# Patient Record
Sex: MALE | Race: WHITE | NOT HISPANIC OR LATINO | Employment: PART TIME | URBAN - METROPOLITAN AREA
[De-identification: names, ages, dates, MRNs, and addresses within clinical notes are randomized per-mention and may not be internally consistent; named-entity substitution may affect disease eponyms.]

---

## 2023-11-11 ASSESSMENT — ENCOUNTER SYMPTOMS
APPETITE CHANGE: 0
ABDOMINAL DISTENTION: 0
JOINT SWELLING: 0
POLYPHAGIA: 0
FACIAL SWELLING: 0
COLOR CHANGE: 0
DIARRHEA: 0
SORE THROAT: 0
TREMORS: 0
DIFFICULTY URINATING: 0
FEVER: 0
CHOKING: 0
ABDOMINAL PAIN: 0
DIZZINESS: 0
ANAL BLEEDING: 0
HEMATURIA: 0
NAUSEA: 0
CONSTIPATION: 0
PALPITATIONS: 0
SHORTNESS OF BREATH: 0
FREQUENCY: 0
HEADACHES: 0
WHEEZING: 0
EYE DISCHARGE: 0
NERVOUS/ANXIOUS: 0
MYALGIAS: 0
EYE PAIN: 0
POLYDIPSIA: 0
ARTHRALGIAS: 0
WEAKNESS: 0
CONFUSION: 0
FATIGUE: 0
COUGH: 0
SLEEP DISTURBANCE: 0
CHEST TIGHTNESS: 0
VOMITING: 0
NUMBNESS: 0
CHILLS: 0

## 2023-11-12 NOTE — PROGRESS NOTES
Subjective   Patient ID: Pavel Ha is a 37 y.o. male  with no significant medical history who presents for No chief complaint on file..    [facility]    Review of Systems   Constitutional:  Negative for appetite change, chills, fatigue and fever.   HENT:  Negative for congestion, ear pain, facial swelling, hearing loss, nosebleeds and sore throat.    Eyes:  Negative for pain, discharge and visual disturbance.   Respiratory:  Negative for cough, choking, chest tightness, shortness of breath and wheezing.    Cardiovascular:  Negative for chest pain, palpitations and leg swelling.   Gastrointestinal:  Negative for abdominal distention, abdominal pain, anal bleeding, constipation, diarrhea, nausea and vomiting.   Endocrine: Negative for cold intolerance, heat intolerance, polydipsia, polyphagia and polyuria.   Genitourinary:  Negative for difficulty urinating, frequency, hematuria and urgency.   Musculoskeletal:  Negative for arthralgias, gait problem, joint swelling and myalgias.   Skin:  Negative for color change and rash.   Neurological:  Negative for dizziness, tremors, syncope, weakness, numbness and headaches.   Psychiatric/Behavioral:  Negative for behavioral problems, confusion, sleep disturbance and suicidal ideas. The patient is not nervous/anxious.        Objective   There were no vitals taken for this visit.    Physical Exam  Constitutional:       General: He is not in acute distress.     Appearance: Normal appearance.   HENT:      Head: Normocephalic and atraumatic.      Nose: Nose normal.   Eyes:      Extraocular Movements: Extraocular movements intact.      Conjunctiva/sclera: Conjunctivae normal.      Pupils: Pupils are equal, round, and reactive to light.   Cardiovascular:      Rate and Rhythm: Normal rate and regular rhythm.      Pulses: Normal pulses.      Heart sounds: Normal heart sounds.   Pulmonary:      Effort: Pulmonary effort is normal.       Breath sounds: Normal breath sounds.   Abdominal:      General: Bowel sounds are normal.      Palpations: Abdomen is soft.   Musculoskeletal:         General: Normal range of motion.      Cervical back: Normal range of motion and neck supple.   Neurological:      General: No focal deficit present.      Mental Status: He is alert and oriented to person, place, and time.   Psychiatric:         Mood and Affect: Mood normal.         Behavior: Behavior normal.         Thought Content: Thought content normal.         Judgment: Judgment normal.         Assessment/Plan   {Assess/PlanSmartLinks:18690}

## 2023-11-14 RX ORDER — DOXYCYCLINE 100 MG/1
CAPSULE ORAL 2 TIMES DAILY
COMMUNITY
Start: 2019-09-11 | End: 2023-11-15 | Stop reason: ALTCHOICE

## 2023-11-14 ASSESSMENT — ENCOUNTER SYMPTOMS
DIZZINESS: 0
FREQUENCY: 0
FEVER: 0
EYE PAIN: 0
JOINT SWELLING: 0
POLYPHAGIA: 0
PALPITATIONS: 0
HEMATURIA: 0
EYE DISCHARGE: 0
ABDOMINAL DISTENTION: 0
NERVOUS/ANXIOUS: 0
ABDOMINAL PAIN: 0
SHORTNESS OF BREATH: 0
MYALGIAS: 0
FATIGUE: 0
DIFFICULTY URINATING: 0
NAUSEA: 0
APPETITE CHANGE: 0
CHILLS: 0
POLYDIPSIA: 0
VOMITING: 0
SLEEP DISTURBANCE: 0
CONSTIPATION: 0
ARTHRALGIAS: 0
WHEEZING: 0
CHEST TIGHTNESS: 0
TREMORS: 0
ANAL BLEEDING: 0
CONFUSION: 0
COUGH: 0
SORE THROAT: 0
NUMBNESS: 0
HEADACHES: 0
COLOR CHANGE: 0
CHOKING: 0
DIARRHEA: 0
FACIAL SWELLING: 0
WEAKNESS: 0

## 2023-11-14 NOTE — PROGRESS NOTES
Subjective   Patient ID: Pavel Ha is a 37 y.o. male with no significant medical history who presents for Establish Care, Back Pain, and Left shoulder pain.    HPI the patient is presented to become reestablished care with a primary care provider.  He is complaining of left shoulder pain which he developed 2 months. The pain comes and goes and gets worse with some movements.   He is also complaining of numbness of his left hand which comes and goes.  He works a lot with his hands at work.  Denies pain.  Additionally, he is complaining of low back pain which comes 2-3 times a years. It is localized on the left side and radiates to his left hip down the foot. It gets worse with bending certain way or lifting. He denies tingling or numbness of the lower extremities.  He denies any injury or trauma in the past.  Does not lift anything heavy.    Review of Systems   Constitutional:  Negative for appetite change, chills, fatigue and fever.   HENT:  Negative for congestion, ear pain, facial swelling, hearing loss, nosebleeds and sore throat.    Eyes:  Negative for pain, discharge and visual disturbance.   Respiratory:  Negative for cough, choking, chest tightness, shortness of breath and wheezing.    Cardiovascular:  Negative for chest pain, palpitations and leg swelling.   Gastrointestinal:  Negative for abdominal distention, abdominal pain, anal bleeding, constipation, diarrhea, nausea and vomiting.   Endocrine: Negative for cold intolerance, heat intolerance, polydipsia, polyphagia and polyuria.   Genitourinary:  Negative for difficulty urinating, frequency, hematuria and urgency.   Musculoskeletal:  Negative for arthralgias, gait problem, joint swelling and myalgias.        Left shoulder pain, lower back pain   Skin:  Negative for color change and rash.   Neurological:  Negative for dizziness, tremors, syncope, weakness, numbness and headaches.        Tingling and numbness of left hand   Psychiatric/Behavioral:  " Negative for behavioral problems, confusion, sleep disturbance and suicidal ideas. The patient is not nervous/anxious.        Objective   /72   Temp 36.2 °C (97.1 °F)   Ht 1.702 m (5' 7\")   Wt 81.6 kg (180 lb)   BMI 28.19 kg/m²     Physical Exam  Constitutional:       General: He is not in acute distress.     Appearance: Normal appearance.   HENT:      Head: Normocephalic and atraumatic.      Nose: Nose normal.   Eyes:      Extraocular Movements: Extraocular movements intact.      Conjunctiva/sclera: Conjunctivae normal.      Pupils: Pupils are equal, round, and reactive to light.   Cardiovascular:      Rate and Rhythm: Normal rate and regular rhythm.      Pulses: Normal pulses.      Heart sounds: Normal heart sounds.   Pulmonary:      Effort: Pulmonary effort is normal.      Breath sounds: Normal breath sounds.   Abdominal:      General: Bowel sounds are normal.      Palpations: Abdomen is soft.   Musculoskeletal:         General: Normal range of motion.      Cervical back: Normal range of motion and neck supple.      Comments: Mild point tenderness on palpation over left piriformis   Neurological:      General: No focal deficit present.      Mental Status: He is alert and oriented to person, place, and time.   Psychiatric:         Mood and Affect: Mood normal.         Behavior: Behavior normal.         Thought Content: Thought content normal.         Judgment: Judgment normal.         Assessment/Plan     Low back pain  Occasional  With radiation to the left lower leg  Start physical therapy, referral is given  Apply heating pads  Take Tylenol Extra Strength 2-3 times a day as needed for pain  Avoid NSAIDs  X-ray lumbar requisitions given to the patient    Left shoulder pain  Comes and goes  Start physical therapy  Apply heating pads  Apply topical creams such as Voltaren or Biofreeze    Tingling and numbness of left hand  Most likely due to carpal tunnel syndrome  Wear a wrist brace at night  Avoid " repetitive movements  If it gets worse, we will refer to hand specialist    Follow-up as needed

## 2023-11-15 ENCOUNTER — ANCILLARY PROCEDURE (OUTPATIENT)
Dept: RADIOLOGY | Facility: CLINIC | Age: 38
End: 2023-11-15
Payer: MEDICAID

## 2023-11-15 ENCOUNTER — OFFICE VISIT (OUTPATIENT)
Dept: PRIMARY CARE | Facility: CLINIC | Age: 38
End: 2023-11-15
Payer: MEDICAID

## 2023-11-15 VITALS
HEIGHT: 67 IN | BODY MASS INDEX: 28.25 KG/M2 | TEMPERATURE: 97.1 F | DIASTOLIC BLOOD PRESSURE: 72 MMHG | SYSTOLIC BLOOD PRESSURE: 112 MMHG | WEIGHT: 180 LBS

## 2023-11-15 DIAGNOSIS — R53.83 OTHER FATIGUE: ICD-10-CM

## 2023-11-15 DIAGNOSIS — M54.42 CHRONIC LEFT-SIDED LOW BACK PAIN WITH LEFT-SIDED SCIATICA: ICD-10-CM

## 2023-11-15 DIAGNOSIS — E78.5 DYSLIPIDEMIA: ICD-10-CM

## 2023-11-15 DIAGNOSIS — E55.9 MILD VITAMIN D DEFICIENCY: ICD-10-CM

## 2023-11-15 DIAGNOSIS — G56.02 CARPAL TUNNEL SYNDROME OF LEFT WRIST: ICD-10-CM

## 2023-11-15 DIAGNOSIS — G89.29 CHRONIC LEFT SHOULDER PAIN: ICD-10-CM

## 2023-11-15 DIAGNOSIS — M25.512 CHRONIC LEFT SHOULDER PAIN: ICD-10-CM

## 2023-11-15 DIAGNOSIS — G89.29 CHRONIC LEFT-SIDED LOW BACK PAIN WITH LEFT-SIDED SCIATICA: Primary | ICD-10-CM

## 2023-11-15 DIAGNOSIS — G89.29 CHRONIC LEFT-SIDED LOW BACK PAIN WITH LEFT-SIDED SCIATICA: ICD-10-CM

## 2023-11-15 DIAGNOSIS — M54.42 CHRONIC LEFT-SIDED LOW BACK PAIN WITH LEFT-SIDED SCIATICA: Primary | ICD-10-CM

## 2023-11-15 PROCEDURE — 72110 X-RAY EXAM L-2 SPINE 4/>VWS: CPT | Performed by: RADIOLOGY

## 2023-11-15 PROCEDURE — 72110 X-RAY EXAM L-2 SPINE 4/>VWS: CPT | Mod: FY

## 2023-11-15 PROCEDURE — 1036F TOBACCO NON-USER: CPT | Performed by: PHYSICIAN ASSISTANT

## 2023-11-15 PROCEDURE — 99204 OFFICE O/P NEW MOD 45 MIN: CPT | Performed by: PHYSICIAN ASSISTANT

## 2023-11-15 RX ORDER — AMOXICILLIN AND CLAVULANATE POTASSIUM 875; 125 MG/1; MG/1
TABLET, FILM COATED ORAL
COMMUNITY
Start: 2023-06-21 | End: 2023-11-15 | Stop reason: ALTCHOICE

## 2023-11-15 ASSESSMENT — PATIENT HEALTH QUESTIONNAIRE - PHQ9
SUM OF ALL RESPONSES TO PHQ9 QUESTIONS 1 AND 2: 0
1. LITTLE INTEREST OR PLEASURE IN DOING THINGS: NOT AT ALL
2. FEELING DOWN, DEPRESSED OR HOPELESS: NOT AT ALL

## 2023-11-15 ASSESSMENT — COLUMBIA-SUICIDE SEVERITY RATING SCALE - C-SSRS
1. IN THE PAST MONTH, HAVE YOU WISHED YOU WERE DEAD OR WISHED YOU COULD GO TO SLEEP AND NOT WAKE UP?: NO
2. HAVE YOU ACTUALLY HAD ANY THOUGHTS OF KILLING YOURSELF?: NO
6. HAVE YOU EVER DONE ANYTHING, STARTED TO DO ANYTHING, OR PREPARED TO DO ANYTHING TO END YOUR LIFE?: NO

## 2023-11-15 ASSESSMENT — ENCOUNTER SYMPTOMS
OCCASIONAL FEELINGS OF UNSTEADINESS: 0
DEPRESSION: 0
LOSS OF SENSATION IN FEET: 0

## 2023-11-16 ENCOUNTER — LAB (OUTPATIENT)
Dept: LAB | Facility: LAB | Age: 38
End: 2023-11-16
Payer: MEDICAID

## 2023-11-16 DIAGNOSIS — E78.5 DYSLIPIDEMIA: ICD-10-CM

## 2023-11-16 DIAGNOSIS — E55.9 MILD VITAMIN D DEFICIENCY: ICD-10-CM

## 2023-11-16 DIAGNOSIS — R53.83 OTHER FATIGUE: ICD-10-CM

## 2023-11-16 LAB
ALBUMIN SERPL BCP-MCNC: 4.9 G/DL (ref 3.4–5)
ALP SERPL-CCNC: 66 U/L (ref 33–120)
ALT SERPL W P-5'-P-CCNC: 17 U/L (ref 10–52)
ANION GAP SERPL CALC-SCNC: 16 MMOL/L (ref 10–20)
AST SERPL W P-5'-P-CCNC: 20 U/L (ref 9–39)
BILIRUB SERPL-MCNC: 0.9 MG/DL (ref 0–1.2)
BUN SERPL-MCNC: 20 MG/DL (ref 6–23)
CALCIUM SERPL-MCNC: 9.5 MG/DL (ref 8.6–10.6)
CHLORIDE SERPL-SCNC: 103 MMOL/L (ref 98–107)
CHOLEST SERPL-MCNC: 183 MG/DL (ref 0–199)
CHOLESTEROL/HDL RATIO: 2.7
CO2 SERPL-SCNC: 24 MMOL/L (ref 21–32)
CREAT SERPL-MCNC: 1.03 MG/DL (ref 0.5–1.3)
ERYTHROCYTE [DISTWIDTH] IN BLOOD BY AUTOMATED COUNT: 12.7 % (ref 11.5–14.5)
GFR SERPL CREATININE-BSD FRML MDRD: >90 ML/MIN/1.73M*2
GLUCOSE SERPL-MCNC: 88 MG/DL (ref 74–99)
HCT VFR BLD AUTO: 45.1 % (ref 41–52)
HDLC SERPL-MCNC: 67.2 MG/DL
HGB BLD-MCNC: 15.1 G/DL (ref 13.5–17.5)
LDLC SERPL CALC-MCNC: 107 MG/DL
MCH RBC QN AUTO: 31 PG (ref 26–34)
MCHC RBC AUTO-ENTMCNC: 33.5 G/DL (ref 32–36)
MCV RBC AUTO: 93 FL (ref 80–100)
NON HDL CHOLESTEROL: 116 MG/DL (ref 0–149)
NRBC BLD-RTO: 0 /100 WBCS (ref 0–0)
PLATELET # BLD AUTO: 225 X10*3/UL (ref 150–450)
POTASSIUM SERPL-SCNC: 4.1 MMOL/L (ref 3.5–5.3)
PROT SERPL-MCNC: 7.7 G/DL (ref 6.4–8.2)
RBC # BLD AUTO: 4.87 X10*6/UL (ref 4.5–5.9)
SODIUM SERPL-SCNC: 139 MMOL/L (ref 136–145)
TRIGL SERPL-MCNC: 42 MG/DL (ref 0–149)
TSH SERPL-ACNC: 1.41 MIU/L (ref 0.44–3.98)
VLDL: 8 MG/DL (ref 0–40)
WBC # BLD AUTO: 7.1 X10*3/UL (ref 4.4–11.3)

## 2023-11-16 PROCEDURE — 84443 ASSAY THYROID STIM HORMONE: CPT

## 2023-11-16 PROCEDURE — 82652 VIT D 1 25-DIHYDROXY: CPT

## 2023-11-16 PROCEDURE — 36415 COLL VENOUS BLD VENIPUNCTURE: CPT

## 2023-11-16 PROCEDURE — 80053 COMPREHEN METABOLIC PANEL: CPT

## 2023-11-16 PROCEDURE — 85027 COMPLETE CBC AUTOMATED: CPT

## 2023-11-16 PROCEDURE — 80061 LIPID PANEL: CPT

## 2023-11-18 LAB — 1,25(OH)2D3 SERPL-MCNC: 61.7 PG/ML (ref 19.9–79.3)

## 2023-11-20 ENCOUNTER — APPOINTMENT (OUTPATIENT)
Dept: PRIMARY CARE | Facility: CLINIC | Age: 38
End: 2023-11-20
Payer: MEDICAID

## 2023-11-30 ASSESSMENT — ENCOUNTER SYMPTOMS
COUGH: 0
JOINT SWELLING: 0
FATIGUE: 0
WHEEZING: 0
CHILLS: 0
CHOKING: 0
POLYPHAGIA: 0
FREQUENCY: 0
NUMBNESS: 0
SORE THROAT: 0
MYALGIAS: 0
DIZZINESS: 0
NAUSEA: 0
DIFFICULTY URINATING: 0
POLYDIPSIA: 0
APPETITE CHANGE: 0
DIARRHEA: 0
CHEST TIGHTNESS: 0
EYE PAIN: 0
WEAKNESS: 0
FEVER: 0
CONSTIPATION: 0
PALPITATIONS: 0
ARTHRALGIAS: 0
EYE DISCHARGE: 0
ANAL BLEEDING: 0
FACIAL SWELLING: 0
TREMORS: 0
ABDOMINAL DISTENTION: 0
COLOR CHANGE: 0
HEMATURIA: 0
HEADACHES: 0
ABDOMINAL PAIN: 0
CONFUSION: 0
SHORTNESS OF BREATH: 0
SLEEP DISTURBANCE: 0
NERVOUS/ANXIOUS: 0
VOMITING: 0

## 2023-12-01 NOTE — PROGRESS NOTES
"Subjective   Patient ID: Pavel Ha is a 38 y.o. male with no significant medical history who presents for Earache (Left/Wax and wanes for approx. 2 months).    HPI the patient is presented with complaints of left ear pain which comes once or twice per week for the past 2 months. The pain lasts for 5 minutes. There is no sharp or shooting pain. He denies drainage, fever, chills, headaches or dizziness.  Per patient, he grinded his teeth in the past but is not sure if he does now.  The patient started physical therapy for lower back pain and shoulder pain.  Stated it had gotten a little better.    Review of Systems   Constitutional:  Negative for appetite change, chills, fatigue and fever.   HENT:  Positive for ear pain. Negative for congestion, facial swelling, hearing loss, nosebleeds and sore throat.    Eyes:  Negative for pain, discharge and visual disturbance.   Respiratory:  Negative for cough, choking, chest tightness, shortness of breath and wheezing.    Cardiovascular:  Negative for chest pain, palpitations and leg swelling.   Gastrointestinal:  Negative for abdominal distention, abdominal pain, anal bleeding, constipation, diarrhea, nausea and vomiting.   Endocrine: Negative for cold intolerance, heat intolerance, polydipsia, polyphagia and polyuria.   Genitourinary:  Negative for difficulty urinating, frequency, hematuria and urgency.   Musculoskeletal:  Negative for arthralgias, gait problem, joint swelling and myalgias.        Left shoulder pain, lower back pain   Skin:  Negative for color change and rash.   Neurological:  Negative for dizziness, tremors, syncope, weakness, numbness and headaches.        Tingling and numbness of left hand   Psychiatric/Behavioral:  Negative for behavioral problems, confusion, sleep disturbance and suicidal ideas. The patient is not nervous/anxious.        Objective   /70   Temp 36.1 °C (96.9 °F)   Ht 1.702 m (5' 7\")   Wt 81.6 kg (180 lb)   BMI 28.19 " kg/m²     Physical Exam  Constitutional:       General: He is not in acute distress.     Appearance: Normal appearance.   HENT:      Head: Normocephalic and atraumatic.      Ears:      Comments: Unremarkable left ear exam     Nose: Nose normal.      Mouth/Throat:      Comments: Grinded front lower teeth  Eyes:      Extraocular Movements: Extraocular movements intact.      Conjunctiva/sclera: Conjunctivae normal.      Pupils: Pupils are equal, round, and reactive to light.   Cardiovascular:      Rate and Rhythm: Normal rate and regular rhythm.      Pulses: Normal pulses.      Heart sounds: Normal heart sounds.   Pulmonary:      Effort: Pulmonary effort is normal.      Breath sounds: Normal breath sounds.   Abdominal:      General: Bowel sounds are normal.      Palpations: Abdomen is soft.   Musculoskeletal:         General: Normal range of motion.      Cervical back: Normal range of motion and neck supple.      Comments: Mild point tenderness on palpation over left piriformis   Neurological:      General: No focal deficit present.      Mental Status: He is alert and oriented to person, place, and time.   Psychiatric:         Mood and Affect: Mood normal.         Behavior: Behavior normal.         Thought Content: Thought content normal.         Judgment: Judgment normal.         Assessment/Plan   Left earache  Unremarkable exam  Most likely due to TMJ  The patient was advised to get a mouthguard  If no improvement, will consider ENT referral    Low back pain  Occasional  With radiation to the left lower leg  Continue physical therapy  Apply heating pads  Take Tylenol Extra Strength 2-3 times a day as needed for pain  Avoid NSAIDs  X-ray lumbar demonstrated spondylosis and facet disease    Left shoulder pain  Comes and goes  Start physical therapy  Apply heating pads  Apply topical creams such as Voltaren or Biofreeze    Tingling and numbness of left hand  Most likely due to carpal tunnel syndrome  Wear a wrist brace  at night  Avoid repetitive movements  If it gets worse, we will refer to hand specialist    Follow-up as needed

## 2023-12-04 ENCOUNTER — EVALUATION (OUTPATIENT)
Dept: PHYSICAL THERAPY | Facility: CLINIC | Age: 38
End: 2023-12-04
Payer: MEDICAID

## 2023-12-04 DIAGNOSIS — R29.898 WEAKNESS OF BOTH HIPS: ICD-10-CM

## 2023-12-04 DIAGNOSIS — M25.512 CHRONIC LEFT SHOULDER PAIN: ICD-10-CM

## 2023-12-04 DIAGNOSIS — G89.29 CHRONIC LEFT-SIDED LOW BACK PAIN WITH LEFT-SIDED SCIATICA: Primary | ICD-10-CM

## 2023-12-04 DIAGNOSIS — M54.42 CHRONIC LEFT-SIDED LOW BACK PAIN WITH LEFT-SIDED SCIATICA: Primary | ICD-10-CM

## 2023-12-04 DIAGNOSIS — G89.29 CHRONIC LEFT SHOULDER PAIN: ICD-10-CM

## 2023-12-04 PROCEDURE — 97162 PT EVAL MOD COMPLEX 30 MIN: CPT | Mod: GP

## 2023-12-04 PROCEDURE — 97110 THERAPEUTIC EXERCISES: CPT | Mod: GP

## 2023-12-04 NOTE — PROGRESS NOTES
Physical Therapy  Physical Therapy Orthopedic Evaluation    Patient Name: Pavel Ha  MRN: [id number]  Today's Date: 12/5/2023       Insurance:  Visit number: 1 of 30  Authorization info: No auth   Insurance Type: Payor: [employer] MEDICA[employer] / Plan: [employer] / Product Type: *No Product type* /      Current Problem  1. Chronic left-sided low back pain with left-sided sciatica  Referral to Physical Therapy    Follow Up In Physical Therapy      2. Chronic left shoulder pain  Referral to Physical Therapy    Follow Up In Physical Therapy      3. Weakness of both hips  Follow Up In Physical Therapy          Referred by: Dr. Angela Pulido     General:  General  Reason for Referral: L shoulder and low back pain  Referred By: Dr. Pulido    Precautions:  Precautions  Precautions Comment: None  Medical History Form: Reviewed (scanned into chart)    Subjective:   MARY: Pt reports to evaluation due to L sided low back pain with radicular symptoms and L shoulder pain. 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 His shoulder has been bothering him for about 2 months. Pt has to use his upper body a lot for work and for other physical activities. Pt will sometimes get pain down his leg and into his foot. Pt will also get some numbness into his L hand from time to time.     Previous Med Management: Stretching and imaging of low back.     PMH: Nothing related to back or shoulder     Aggravating Factors: Shoulder: Reaching away from his body, driving, putting things on shelves. Back: Bending over, lifting things with bad form.     Relieving Factors:  Shoulder: Keeping it next to him. Back: Lay down, sitting down.     Pain/Symptom Scale:  Current: Shoulder: 2/10 Back: 0/10  Worst: Shoulder: 8/10 Back: 8/10  Best: 0/10 for both   Location/Nature:  Meds: Ibuprofen     PLOF: Pt is able to perform all activities and tasks but has to monitor his pain.    ADL: Pt has some discomfort when performing UE dressing but is able to complete it.   Work: [employer]   Athletics: Snowboarding, running, swimming   Recreation/ Hobbies: Being with his daughter     Goals: Pt would like to improve his lumbar pain and get his shoulder back to his PLOF.     Language: English      Red Flags: Do you have any of the following? No  Fever/chills, unexplained weight changes, dizziness/fainting, unexplained change in bowel or bladder functions, unexplained malaise or muscle weakness, night pain/sweats, numbness or tingling    Objective:  Palpation / Observation   Pt had increased lumbar paraspinal and lumbar spine stiffness upon palpation. Some tenderness upon palpation around bicep tendon region.     Repeated lumbar flexion: Increased discomfort  Repeated lumbar extension: Slight decrease in symptoms     Hip MMT  Flexion: 4+/5 BL   Abduction- R: 4+/5 L: 4/5  Adduction- 4+/5 BL  Extension: 4+/5 BL    Shoulder ROM  Flexion: 160 BL   Abduction: 150 BL  ER- R: 45 L: 35 with pain   IR- R: 50 L: 50     Shoulder MMT  Flexion- R: 4+/5  L: 4/5   Abduction- R: 4+/5  L: 4/5 painful   ER- R: 5/5 L: 4+/5 painful   IR- 5/5 BL4    Outcome Measures:  Other Measures  Oswestry Disablity Index (CONNOR): 7/50   QuickDASH: 16/55    Treatments:  Access Code: [email or web address]  URL: [email or web address]  Date: 12/04/2023  Prepared by: Selvin Rojas    Exercises  - Standing Lumbar Extension with Counter  - 1 x daily - 7 x weekly - 3 sets - 10 reps  - Supine Bridge  - 1 x daily - 7 x weekly - 3 sets - 10 reps  - Hooklying Clamshell with Resistance  - 1 x daily - 7 x weekly - 3 sets - 10 reps  - Shoulder External Rotation and Scapular Retraction with Resistance  - 2-3 x daily - 7 x weekly - 1 sets - 5 reps - 30 sec  hold    EDUCATION: Home exercise program, plan of care, activity modifications, pain management, and injury pathology       Assessment:     Patient is a 38 year old male that presents  to physical therapy evaluation today due to complaints of L shoulder and L sided low back pain. Patient impairments include flexibility deficits of L shoulder and lumbar spine, strength deficits in RTC and core musculature, and motor control deficits including lack of pelvic control. Due to these impairments, the patients has the following functional limitations: pain with lifting heavier objects, difficulty reaching away from the body, difficulty with work duties, and an overall decrease in functional mobility. Skilled therapy recommended in order to to address their impairments and progress towards the associated functional goals. Prognosis is good secondary to their current presentation and client understanding. The pt demonstrates a good understanding of their current plan of care, rehab expectations, and prognosis.          Plan:     Planned Interventions include: therapeutic exercise, self-care home management, manual therapy, therapeutic activities, gait training, neuromuscular coordination, vasopneumatic, dry needling, aquatic therapy  Frequency: 1 x Week  Duration: 12 Weeks  Goals: Set and discussed today  Active       PT Problem       PT Lumbar Goals       Start:  12/05/23       1. Pt will decrease CONNOR to 5% or less in order to demo a decrease in back pain   2. Pt petr increase all hip MMT to 4+/5 or greater in order to demo an increase in muscular strength   3. Pt will be able to perform repeated lumbar/thoracic flexion, extension, and side bending with no increase in discomfort in order to demo an increase in functional mobility   4. Pt will be able to stand for 1 hour with no increase in pain or discomfort in order to demo an increase in functional ability           PT Shoulder  Goals       Start:  12/05/23       1. Pt will decrease QuickDASH to 12/55 or better in order to demonstrate an increase in UE function   2. Pt will increase her L shoulder flexion, abduction, and ER by a minimum of 10 degrees in  order to demonstrate an increase in UE ROM.   3. Pt will increase all UE MMT to 4+/5 or better in order to demonstrate an increase in functional strength  4. Pt will be able to place items in high cabinet with no increase in pain or discomfort in order to demonstrate an increase in functional mobility.   5. Pt will be able to wash hair with L UE with no feeling of stiffness in order to demonstrate an increase in functional mobility.   6. Pt will complete HEP independently and compliantly with no increase in pain or discomfort               Plan of care was developed with input and agreement by the patient      Selvin Rojas PT

## 2023-12-05 ENCOUNTER — OFFICE VISIT (OUTPATIENT)
Dept: PRIMARY CARE | Facility: CLINIC | Age: 38
End: 2023-12-05
Payer: MEDICAID

## 2023-12-05 VITALS
WEIGHT: 180 LBS | BODY MASS INDEX: 28.25 KG/M2 | DIASTOLIC BLOOD PRESSURE: 70 MMHG | HEIGHT: 67 IN | SYSTOLIC BLOOD PRESSURE: 118 MMHG | TEMPERATURE: 96.9 F

## 2023-12-05 DIAGNOSIS — H92.02 EARACHE, LEFT: Primary | ICD-10-CM

## 2023-12-05 PROBLEM — R29.898 HIP WEAKNESS: Status: ACTIVE | Noted: 2023-12-05

## 2023-12-05 PROCEDURE — 99213 OFFICE O/P EST LOW 20 MIN: CPT | Performed by: PHYSICIAN ASSISTANT

## 2023-12-05 PROCEDURE — 1036F TOBACCO NON-USER: CPT | Performed by: PHYSICIAN ASSISTANT

## 2023-12-20 ENCOUNTER — TREATMENT (OUTPATIENT)
Dept: PHYSICAL THERAPY | Facility: CLINIC | Age: 38
End: 2023-12-20
Payer: MEDICAID

## 2023-12-20 DIAGNOSIS — G89.29 CHRONIC LEFT SHOULDER PAIN: Primary | ICD-10-CM

## 2023-12-20 DIAGNOSIS — R29.898 WEAKNESS OF BOTH HIPS: ICD-10-CM

## 2023-12-20 DIAGNOSIS — M54.42 CHRONIC LEFT-SIDED LOW BACK PAIN WITH LEFT-SIDED SCIATICA: ICD-10-CM

## 2023-12-20 DIAGNOSIS — G89.29 CHRONIC LEFT-SIDED LOW BACK PAIN WITH LEFT-SIDED SCIATICA: ICD-10-CM

## 2023-12-20 DIAGNOSIS — M25.512 CHRONIC LEFT SHOULDER PAIN: Primary | ICD-10-CM

## 2023-12-20 PROCEDURE — 97032 APPL MODALITY 1+ESTIM EA 15: CPT | Mod: GP

## 2023-12-20 PROCEDURE — 97110 THERAPEUTIC EXERCISES: CPT | Mod: GP

## 2023-12-20 ASSESSMENT — ENCOUNTER SYMPTOMS
DEPRESSION: 0
OCCASIONAL FEELINGS OF UNSTEADINESS: 0
LOSS OF SENSATION IN FEET: 0

## 2023-12-20 ASSESSMENT — PAIN - FUNCTIONAL ASSESSMENT: PAIN_FUNCTIONAL_ASSESSMENT: 0-10

## 2023-12-20 ASSESSMENT — PAIN SCALES - GENERAL: PAINLEVEL_OUTOF10: 2

## 2023-12-20 NOTE — PROGRESS NOTES
Physical Therapy  Physical Therapy Treatment Note    Patient Name: Pavel Ha  MRN: [id number]  Today's Date: 12/20/2023  Time Calculation  Start Time: 0932  Stop Time: 1016  Time Calculation (min): 44 min    Insurance:  Visit number: 2 of 30  Authorization info: No auth   Insurance Type: Payor: [employer] MEDICA[employer] / Plan: [employer]ICA[employer] / Product Type: *No Product type* /   Current Problem  1. Chronic left shoulder pain        2. Chronic left-sided low back pain with left-sided sciatica        3. Weakness of both hips          General  Reason for Referral: L shoulder and low back pain  Referred By: Dr. Pulido  Precautions  Precautions  Precautions Comment: None  Subjective:     Patient reports that his low back has been a lot better but his shoulder has continued to bother him. Pt mainly notices this pain when picking up with daughter   Pain  Pain Assessment: 0-10  Pain Score: 2  Objective:   Palpation / Observation   Pt had increased lumbar paraspinal and lumbar spine stiffness upon palpation. Some tenderness upon palpation around bicep tendon region.      Repeated lumbar flexion: Increased discomfort  Repeated lumbar extension: Slight decrease in symptoms      Hip MMT  Flexion: 4+/5 BL   Abduction- R: 4+/5 L: 4/5  Adduction- 4+/5 BL  Extension: 4+/5 BL     Shoulder ROM  Flexion: 160 BL   Abduction: 150 BL  ER- R: 45 L: 35 with pain   IR- R: 50 L: 50      Shoulder MMT  Flexion- R: 4+/5  L: 4/5   Abduction- R: 4+/5  L: 4/5 painful   ER- R: 5/5 L: 4+/5 painful   IR- 5/5 BL4     Outcome Measures:  Other Measures  Oswestry Disablity Index (CONNOR): 7/50   QuickDASH: 16/55     Treatments:     THERE EX  Pt education on HEP  Seated banded ER (red) 3 x 10   Banded D2 flexion 3 x 10   Banded pull aparts 2 x 10   Banded row (green) 2 x 15     MANUAL    Thermaprobe 5 intensity with heat x 10 mins     Assessment:  Pt tolerated session well today. Pt was able to continue to progress all scapular  and shoulder strengthening exercises without any increase in discomfort. Pt verbalized that he also felt some relief with use of thermaprobe. Pt continues to progress towards goals established in the POC and should continue with skilled therapy.       Plan:     Planned Interventions include: therapeutic exercise, self-care home management, manual therapy, therapeutic activities, gait training, neuromuscular coordination, vasopneumatic, dry needling, aquatic therapy      Goals:  Active       PT Problem       PT Lumbar Goals       Start:  12/05/23       1. Pt will decrease CONNOR to 5% or less in order to demo a decrease in back pain   2. Pt petr increase all hip MMT to 4+/5 or greater in order to demo an increase in muscular strength   3. Pt will be able to perform repeated lumbar/thoracic flexion, extension, and side bending with no increase in discomfort in order to demo an increase in functional mobility   4. Pt will be able to stand for 1 hour with no increase in pain or discomfort in order to demo an increase in functional ability           PT Shoulder  Goals       Start:  12/05/23       1. Pt will decrease QuickDASH to 12/55 or better in order to demonstrate an increase in UE function   2. Pt will increase her L shoulder flexion, abduction, and ER by a minimum of 10 degrees in order to demonstrate an increase in UE ROM.   3. Pt will increase all UE MMT to 4+/5 or better in order to demonstrate an increase in functional strength  4. Pt will be able to place items in high cabinet with no increase in pain or discomfort in order to demonstrate an increase in functional mobility.   5. Pt will be able to wash hair with L UE with no feeling of stiffness in order to demonstrate an increase in functional mobility.   6. Pt will complete HEP independently and compliantly with no increase in pain or discomfort                Selvin Rojas PT

## 2024-01-01 NOTE — PROGRESS NOTES
Physical Therapy  Physical Therapy Treatment Note    Patient Name: Pavel Ha  MRN: [id number]  Today's Date: 1/2/2024  Time Calculation  Start Time: 0830  Stop Time: 0915  Time Calculation (min): 45 min    Insurance:  Visit number: 3 of 30  Authorization info: No auth   Insurance Type: Payor: [employer] MEDICAID / Plan: [employer] MEDICAID / Product Type: *No Product type* /   Current Problem  1. Chronic left-sided low back pain with left-sided sciatica        2. Chronic left shoulder pain        3. Weakness of both hips          General     Precautions     Subjective:     Shoulder seems to be getting better.  I actually got more pain back over the weekend when tripping with a case of pop.  Can't lay on L because it hurts but can lay on R with pillow prop.  The back feels better but occasionally has discomfort.   Pain     Objective:   Palpation / Observation   Pt had increased lumbar paraspinal and lumbar spine stiffness upon palpation. Some tenderness upon palpation around bicep tendon region.      Repeated lumbar flexion: Increased discomfort  Repeated lumbar extension: Slight decrease in symptoms      Hip MMT  Flexion: 4+/5 BL   Abduction- R: 4+/5 L: 4/5  Adduction- 4+/5 BL  Extension: 4+/5 BL     Shoulder ROM  Flexion: 160 BL   Abduction: 150 BL  ER- R: 45 L: 35 with pain   IR- R: 50 L: 50      Shoulder MMT  Flexion- R: 4+/5  L: 4/5   Abduction- R: 4+/5  L: 4/5 painful   ER- R: 5/5 L: 4+/5 painful   IR- 5/5 BL4     Outcome Measures:  Other Measures  Oswestry Disablity Index (CONNOR): 7/50   QuickDASH: 16/55     Treatments:   [email or web address] access code     THERE EX  Pt education on HEP  UBE x 6 min alt each min  Seated banded ER (grn) 3 x 10   Banded D2 flexion 3 x 10   Banded pull aparts 2 x 10   Banded row (green) 2 x 15   LAE  grn 2 x 15   Wall flexion with YTB 2 x 10   Tennis ball against wall for self TPR    MANUAL    DTM to scapular muscles, UT    Assessment:  Pt tolerated session well  today. Pt was able to continue to progress all scapular and shoulder strengthening exercises without any increase in discomfort. Patient felt good relief with DTM.  Pt continues to progress towards goals established in the POC and should continue with skilled therapy.       Plan:     Planned Interventions include: therapeutic exercise, self-care home management, manual therapy, therapeutic activities, gait training, neuromuscular coordination, vasopneumatic, dry needling, aquatic therapy      Goals:  Active       PT Problem       PT Lumbar Goals       Start:  12/05/23       1. Pt will decrease CONNOR to 5% or less in order to demo a decrease in back pain   2. Pt petr increase all hip MMT to 4+/5 or greater in order to demo an increase in muscular strength   3. Pt will be able to perform repeated lumbar/thoracic flexion, extension, and side bending with no increase in discomfort in order to demo an increase in functional mobility   4. Pt will be able to stand for 1 hour with no increase in pain or discomfort in order to demo an increase in functional ability           PT Shoulder  Goals       Start:  12/05/23       1. Pt will decrease QuickDASH to 12/55 or better in order to demonstrate an increase in UE function   2. Pt will increase her L shoulder flexion, abduction, and ER by a minimum of 10 degrees in order to demonstrate an increase in UE ROM.   3. Pt will increase all UE MMT to 4+/5 or better in order to demonstrate an increase in functional strength  4. Pt will be able to place items in high cabinet with no increase in pain or discomfort in order to demonstrate an increase in functional mobility.   5. Pt will be able to wash hair with L UE with no feeling of stiffness in order to demonstrate an increase in functional mobility.   6. Pt will complete HEP independently and compliantly with no increase in pain or discomfort                Lilia Yang PTA

## 2024-01-02 ENCOUNTER — TREATMENT (OUTPATIENT)
Dept: PHYSICAL THERAPY | Facility: CLINIC | Age: 39
End: 2024-01-02
Payer: MEDICAID

## 2024-01-02 DIAGNOSIS — R29.898 WEAKNESS OF BOTH HIPS: ICD-10-CM

## 2024-01-02 DIAGNOSIS — M54.42 CHRONIC LEFT-SIDED LOW BACK PAIN WITH LEFT-SIDED SCIATICA: Primary | ICD-10-CM

## 2024-01-02 DIAGNOSIS — M25.512 CHRONIC LEFT SHOULDER PAIN: ICD-10-CM

## 2024-01-02 DIAGNOSIS — G89.29 CHRONIC LEFT SHOULDER PAIN: ICD-10-CM

## 2024-01-02 DIAGNOSIS — G89.29 CHRONIC LEFT-SIDED LOW BACK PAIN WITH LEFT-SIDED SCIATICA: Primary | ICD-10-CM

## 2024-01-02 PROCEDURE — 97140 MANUAL THERAPY 1/> REGIONS: CPT | Mod: GP,CQ

## 2024-01-02 PROCEDURE — 97110 THERAPEUTIC EXERCISES: CPT | Mod: GP,CQ

## 2024-01-09 ENCOUNTER — TREATMENT (OUTPATIENT)
Dept: PHYSICAL THERAPY | Facility: CLINIC | Age: 39
End: 2024-01-09
Payer: MEDICAID

## 2024-01-09 DIAGNOSIS — G89.29 CHRONIC LEFT SHOULDER PAIN: Primary | ICD-10-CM

## 2024-01-09 DIAGNOSIS — R29.898 WEAKNESS OF BOTH HIPS: ICD-10-CM

## 2024-01-09 DIAGNOSIS — G89.29 CHRONIC LEFT-SIDED LOW BACK PAIN WITH LEFT-SIDED SCIATICA: ICD-10-CM

## 2024-01-09 DIAGNOSIS — M54.42 CHRONIC LEFT-SIDED LOW BACK PAIN WITH LEFT-SIDED SCIATICA: ICD-10-CM

## 2024-01-09 DIAGNOSIS — M25.512 CHRONIC LEFT SHOULDER PAIN: Primary | ICD-10-CM

## 2024-01-09 PROCEDURE — 97140 MANUAL THERAPY 1/> REGIONS: CPT | Mod: GP

## 2024-01-09 PROCEDURE — 97110 THERAPEUTIC EXERCISES: CPT | Mod: GP

## 2024-01-09 ASSESSMENT — PAIN SCALES - GENERAL: PAINLEVEL_OUTOF10: 1

## 2024-01-09 ASSESSMENT — PAIN - FUNCTIONAL ASSESSMENT: PAIN_FUNCTIONAL_ASSESSMENT: 0-10

## 2024-01-09 NOTE — PROGRESS NOTES
Physical Therapy  Physical Therapy Treatment Note    Patient Name: Pavel Ha  MRN: [id number]  Today's Date: 1/9/2024  Time Calculation  Start Time: 0835  Stop Time: 0915  Time Calculation (min): 40 min    Insurance:  Visit number: 4 of 30  Authorization info: No auth   Insurance Type: Payor: [employer] MEDICAID / Plan: [employer] MEDICAID / Product Type: *No Product type* /   Current Problem  1. Chronic left shoulder pain        2. Chronic left-sided low back pain with left-sided sciatica        3. Weakness of both hips          General  Reason for Referral: L shoulder and low back pain  Referred By: Dr. Pulido  Precautions  Precautions  Precautions Comment: None  Subjective:     Pt continues to feel like his shoulder is improving. When he does have pain, it only gets to around a 2/10    Pain  Pain Assessment: 0-10  Pain Score: 1 (2 at most)  Objective:   Palpation / Observation   Pt had increased lumbar paraspinal and lumbar spine stiffness upon palpation. Some tenderness upon palpation around bicep tendon region.      Repeated lumbar flexion: Increased discomfort  Repeated lumbar extension: Slight decrease in symptoms      Hip MMT  Flexion: 4+/5 BL   Abduction- R: 4+/5 L: 4/5  Adduction- 4+/5 BL  Extension: 4+/5 BL     Shoulder ROM  Flexion: 160 BL   Abduction: 150 BL  ER- R: 45 L: 35 with pain   IR- R: 50 L: 50      Shoulder MMT  Flexion- R: 4+/5  L: 4/5   Abduction- R: 4+/5  L: 4/5 painful   ER- R: 5/5 L: 4+/5 painful   IR- 5/5 BL4     Outcome Measures:  Other Measures  Oswestry Disablity Index (CONNOR): 7/50   QuickDASH: 16/55     Treatments:   [id number] access code     THERE EX  UBE x 6 min alt each min  Supine banded D2 flexion (red tube) 3 x 10   Banded wrist supine flexion 2 x 10   Banded row (blue) 2 x 10   Banded ER (yellow [place] band) 3 x 10     MANUAL    IASTM graston to lateral delt and over bicep tendon     Assessment:  Pt tolerated session well today. Patient was able to  continue to progress shoulder and scapular strengthening exercises today.  Patient tolerated Graston cross friction to bicep tendon well.  Patient continues to make progress towards goals established the plan of care and should continue with skilled therapy.  Plan:     Planned Interventions include: therapeutic exercise, self-care home management, manual therapy, therapeutic activities, gait training, neuromuscular coordination, vasopneumatic, dry needling, aquatic therapy      Goals:  Active       PT Problem       PT Lumbar Goals       Start:  12/05/23       1. Pt will decrease CONNOR to 5% or less in order to demo a decrease in back pain   2. Pt petr increase all hip MMT to 4+/5 or greater in order to demo an increase in muscular strength   3. Pt will be able to perform repeated lumbar/thoracic flexion, extension, and side bending with no increase in discomfort in order to demo an increase in functional mobility   4. Pt will be able to stand for 1 hour with no increase in pain or discomfort in order to demo an increase in functional ability           PT Shoulder  Goals       Start:  12/05/23       1. Pt will decrease QuickDASH to 12/55 or better in order to demonstrate an increase in UE function   2. Pt will increase her L shoulder flexion, abduction, and ER by a minimum of 10 degrees in order to demonstrate an increase in UE ROM.   3. Pt will increase all UE MMT to 4+/5 or better in order to demonstrate an increase in functional strength  4. Pt will be able to place items in high cabinet with no increase in pain or discomfort in order to demonstrate an increase in functional mobility.   5. Pt will be able to wash hair with L UE with no feeling of stiffness in order to demonstrate an increase in functional mobility.   6. Pt will complete HEP independently and compliantly with no increase in pain or discomfort                Selvin Rojas PT

## 2024-01-16 ENCOUNTER — TREATMENT (OUTPATIENT)
Dept: PHYSICAL THERAPY | Facility: CLINIC | Age: 39
End: 2024-01-16
Payer: MEDICAID

## 2024-01-16 DIAGNOSIS — G89.29 CHRONIC LEFT SHOULDER PAIN: Primary | ICD-10-CM

## 2024-01-16 DIAGNOSIS — R29.898 WEAKNESS OF BOTH HIPS: ICD-10-CM

## 2024-01-16 DIAGNOSIS — G89.29 CHRONIC LEFT-SIDED LOW BACK PAIN WITH LEFT-SIDED SCIATICA: ICD-10-CM

## 2024-01-16 DIAGNOSIS — M54.42 CHRONIC LEFT-SIDED LOW BACK PAIN WITH LEFT-SIDED SCIATICA: ICD-10-CM

## 2024-01-16 DIAGNOSIS — M25.512 CHRONIC LEFT SHOULDER PAIN: Primary | ICD-10-CM

## 2024-01-16 PROCEDURE — 97140 MANUAL THERAPY 1/> REGIONS: CPT | Mod: GP

## 2024-01-16 PROCEDURE — 97110 THERAPEUTIC EXERCISES: CPT | Mod: GP

## 2024-01-16 ASSESSMENT — PAIN SCALES - GENERAL: PAINLEVEL_OUTOF10: 1

## 2024-01-16 ASSESSMENT — PAIN - FUNCTIONAL ASSESSMENT: PAIN_FUNCTIONAL_ASSESSMENT: 0-10

## 2024-01-16 NOTE — PROGRESS NOTES
Physical Therapy  Physical Therapy Treatment Note    Patient Name: Pavel Ha  MRN: [id number]  Today's Date: 1/16/2024  Time Calculation  Start Time: 0847  Stop Time: 0930  Time Calculation (min): 43 min    Insurance:  Visit number: 5 of 30  Authorization info: No auth   Insurance Type: Payor: [employer] MEDICAID / Plan: [employer] MEDICAID / Product Type: *No Product type* /   Current Problem  1. Chronic left shoulder pain        2. Chronic left-sided low back pain with left-sided sciatica        3. Weakness of both hips          General  Reason for Referral: L shoulder and low back pain  Referred By: Dr. Pulido  Precautions  Precautions  Precautions Comment: None  Subjective:     Pt has only been having some pain at night but besides that has been feeling good at work and during the day.     Pain  Pain Assessment: 0-10  Pain Score: 1  Objective:   Palpation / Observation   Pt had increased lumbar paraspinal and lumbar spine stiffness upon palpation. Some tenderness upon palpation around bicep tendon region.      Repeated lumbar flexion: Increased discomfort  Repeated lumbar extension: Slight decrease in symptoms      Hip MMT  Flexion: 4+/5 BL   Abduction- R: 4+/5 L: 4/5  Adduction- 4+/5 BL  Extension: 4+/5 BL     Shoulder ROM  Flexion: 160 BL   Abduction: 150 BL  ER- R: 45 L: 35 with pain   IR- R: 50 L: 50      Shoulder MMT  Flexion- R: 4+/5  L: 4/5   Abduction- R: 4+/5  L: 4/5 painful   ER- R: 5/5 L: 4+/5 painful   IR- 5/5 BL4     Outcome Measures:  Other Measures  Oswestry Disablity Index (CONNOR): 7/50   QuickDASH: 16/55     Treatments:   [id number] access code     THERE EX  UBE x 6 min alt each min  Supine chest press (7,9#) 3 x 10   Supine banded ER 3 x 10 (yellow) 2 x 10   Supine shoulder flexion with banded wrist (yellow) 2 x 10   Banded row (red) 2 x 10   Standing banded extension 2 x 10     MANUAL    AP mobs with PROM     Assessment:  Pt tolerated session well today.  Patient was  able to continue to strengthen rotator cuff and shoulder musculature.  Patient had no increase in discomfort throughout the entirety of today's session.  Patient continues to make good progress towards goals established and the plan of care and should continue with skilled therapy.      Plan:     Planned Interventions include: therapeutic exercise, self-care home management, manual therapy, therapeutic activities, gait training, neuromuscular coordination, vasopneumatic, dry needling, aquatic therapy      Goals:  Active       PT Problem       PT Lumbar Goals       Start:  12/05/23       1. Pt will decrease CONNOR to 5% or less in order to demo a decrease in back pain   2. Pt petr increase all hip MMT to 4+/5 or greater in order to demo an increase in muscular strength   3. Pt will be able to perform repeated lumbar/thoracic flexion, extension, and side bending with no increase in discomfort in order to demo an increase in functional mobility   4. Pt will be able to stand for 1 hour with no increase in pain or discomfort in order to demo an increase in functional ability           PT Shoulder  Goals       Start:  12/05/23       1. Pt will decrease QuickDASH to 12/55 or better in order to demonstrate an increase in UE function   2. Pt will increase her L shoulder flexion, abduction, and ER by a minimum of 10 degrees in order to demonstrate an increase in UE ROM.   3. Pt will increase all UE MMT to 4+/5 or better in order to demonstrate an increase in functional strength  4. Pt will be able to place items in high cabinet with no increase in pain or discomfort in order to demonstrate an increase in functional mobility.   5. Pt will be able to wash hair with L UE with no feeling of stiffness in order to demonstrate an increase in functional mobility.   6. Pt will complete HEP independently and compliantly with no increase in pain or discomfort                Selvin Rojas PT

## 2024-01-23 ENCOUNTER — TREATMENT (OUTPATIENT)
Dept: PHYSICAL THERAPY | Facility: CLINIC | Age: 39
End: 2024-01-23
Payer: MEDICAID

## 2024-01-23 DIAGNOSIS — R29.898 WEAKNESS OF BOTH HIPS: ICD-10-CM

## 2024-01-23 DIAGNOSIS — M54.42 CHRONIC LEFT-SIDED LOW BACK PAIN WITH LEFT-SIDED SCIATICA: ICD-10-CM

## 2024-01-23 DIAGNOSIS — G89.29 CHRONIC LEFT-SIDED LOW BACK PAIN WITH LEFT-SIDED SCIATICA: ICD-10-CM

## 2024-01-23 DIAGNOSIS — M25.512 CHRONIC LEFT SHOULDER PAIN: Primary | ICD-10-CM

## 2024-01-23 DIAGNOSIS — G89.29 CHRONIC LEFT SHOULDER PAIN: Primary | ICD-10-CM

## 2024-01-23 PROCEDURE — 97110 THERAPEUTIC EXERCISES: CPT | Mod: GP

## 2024-01-23 PROCEDURE — 97140 MANUAL THERAPY 1/> REGIONS: CPT | Mod: GP

## 2024-01-23 ASSESSMENT — PAIN SCALES - GENERAL: PAINLEVEL_OUTOF10: 1

## 2024-01-23 ASSESSMENT — PAIN - FUNCTIONAL ASSESSMENT: PAIN_FUNCTIONAL_ASSESSMENT: 0-10

## 2024-01-23 NOTE — PROGRESS NOTES
Physical Therapy  Physical Therapy Treatment Note    Patient Name: Pavel Ha  MRN: [id number]  Today's Date: 1/23/2024  Time Calculation  Start Time: 0859  Stop Time: 0932  Time Calculation (min): 33 min    Insurance:  Visit number: 6 of 30  Authorization info: No auth   Insurance Type: Payor: [employer] MEDICAID / Plan: [employer] MEDICAID / Product Type: *No Product type* /   Current Problem  1. Chronic left shoulder pain        2. Chronic left-sided low back pain with left-sided sciatica        3. Weakness of both hips          General  Reason for Referral: L shoulder and low back pain  Referred By: Dr. Pulido  Precautions  Precautions  Precautions Comment: None  Subjective:     Pt's shoulder has been feeling okay for the most part. Pt is only feeling his pain when he's laying on his shoulder but it's not as frequent.     Pain  Pain Assessment: 0-10  Pain Score: 1  Objective:   Palpation / Observation   Pt had increased lumbar paraspinal and lumbar spine stiffness upon palpation. Some tenderness upon palpation around bicep tendon region.      Repeated lumbar flexion: Increased discomfort  Repeated lumbar extension: Slight decrease in symptoms      Hip MMT  Flexion: 4+/5 BL   Abduction- R: 4+/5 L: 4/5  Adduction- 4+/5 BL  Extension: 4+/5 BL     Shoulder ROM  Flexion: 160 BL   Abduction: 150 BL  ER- R: 45 L: 35 with pain   IR- R: 50 L: 50      Shoulder MMT  Flexion- R: 4+/5  L: 4/5   Abduction- R: 4+/5  L: 4/5 painful   ER- R: 5/5 L: 4+/5 painful   IR- 5/5 BL4     Outcome Measures:  Other Measures  Oswestry Disablity Index (CONNOR): 7/50   QuickDASH: 16/55     Treatments:   [email or web address] access code     THERE EX  UBE x 6 min alt each min  PROM of L shoulder   Supine banded ER (red) 2 x 10   Banded ER forward punch (red) 2 x 10   Banded row (heavy) 3 x 10   Banded horizontal abduction (yellow) 2 x 10     MANUAL    Theragun to RTC musculature  AP mobs with PROM     Assessment:  Pt tolerated  session well today.  Patient was able to continue to progress rotator cuff strengthening and stability.  Patient tolerated Thera gun to rotator cuff muscles well.  Patient continues to make good progress towards goals established and plan of care and should continue with skilled therapy.    Plan:     Planned Interventions include: therapeutic exercise, self-care home management, manual therapy, therapeutic activities, gait training, neuromuscular coordination, vasopneumatic, dry needling, aquatic therapy      Goals:  Active       PT Problem       PT Lumbar Goals       Start:  12/05/23       1. Pt will decrease CONNOR to 5% or less in order to demo a decrease in back pain   2. Pt petr increase all hip MMT to 4+/5 or greater in order to demo an increase in muscular strength   3. Pt will be able to perform repeated lumbar/thoracic flexion, extension, and side bending with no increase in discomfort in order to demo an increase in functional mobility   4. Pt will be able to stand for 1 hour with no increase in pain or discomfort in order to demo an increase in functional ability           PT Shoulder  Goals       Start:  12/05/23       1. Pt will decrease QuickDASH to 12/55 or better in order to demonstrate an increase in UE function   2. Pt will increase her L shoulder flexion, abduction, and ER by a minimum of 10 degrees in order to demonstrate an increase in UE ROM.   3. Pt will increase all UE MMT to 4+/5 or better in order to demonstrate an increase in functional strength  4. Pt will be able to place items in high cabinet with no increase in pain or discomfort in order to demonstrate an increase in functional mobility.   5. Pt will be able to wash hair with L UE with no feeling of stiffness in order to demonstrate an increase in functional mobility.   6. Pt will complete HEP independently and compliantly with no increase in pain or discomfort                Selvin Rojas PT

## 2024-01-30 ENCOUNTER — TREATMENT (OUTPATIENT)
Dept: PHYSICAL THERAPY | Facility: CLINIC | Age: 39
End: 2024-01-30
Payer: MEDICAID

## 2024-01-30 DIAGNOSIS — G89.29 CHRONIC LEFT SHOULDER PAIN: Primary | ICD-10-CM

## 2024-01-30 DIAGNOSIS — G89.29 CHRONIC LEFT-SIDED LOW BACK PAIN WITH LEFT-SIDED SCIATICA: ICD-10-CM

## 2024-01-30 DIAGNOSIS — R29.898 WEAKNESS OF BOTH HIPS: ICD-10-CM

## 2024-01-30 DIAGNOSIS — M54.42 CHRONIC LEFT-SIDED LOW BACK PAIN WITH LEFT-SIDED SCIATICA: ICD-10-CM

## 2024-01-30 DIAGNOSIS — M25.512 CHRONIC LEFT SHOULDER PAIN: Primary | ICD-10-CM

## 2024-01-30 PROCEDURE — 97110 THERAPEUTIC EXERCISES: CPT | Mod: GP

## 2024-01-30 PROCEDURE — 97140 MANUAL THERAPY 1/> REGIONS: CPT | Mod: GP

## 2024-01-30 ASSESSMENT — PAIN SCALES - GENERAL: PAINLEVEL_OUTOF10: 0 - NO PAIN

## 2024-01-30 ASSESSMENT — PAIN - FUNCTIONAL ASSESSMENT: PAIN_FUNCTIONAL_ASSESSMENT: 0-10

## 2024-01-30 NOTE — PROGRESS NOTES
Physical Therapy  Physical Therapy Treatment Note    Patient Name: Pavel Ha  MRN: [id number]  Today's Date: 1/30/2024       Insurance:  Visit number: 7 of 30  Authorization info: No auth   Insurance Type: Payor: [employer] MEDICAID / Plan: [employer] MEDICAID / Product Type: *No Product type* /   Current Problem  1. Chronic left shoulder pain        2. Chronic left-sided low back pain with left-sided sciatica        3. Weakness of both hips            General  Reason for Referral: L shoulder and low back pain  Referred By: Dr. Pulido  Precautions  Precautions  Precautions Comment: None  Subjective:     Pt had a day or two where he did not feel his shoulder at all. Pt feels comfortable with this being his last session today.     Pain  Pain Assessment: 0-10  Pain Score: 0 - No pain  Objective:   Palpation / Observation   Pt had increased lumbar paraspinal and lumbar spine stiffness upon palpation. Some tenderness upon palpation around bicep tendon region.      Repeated lumbar flexion: Increased discomfort  Repeated lumbar extension: Slight decrease in symptoms      Hip MMT  Flexion: 4+/5 BL   Abduction- R: 4+/5 L: 4/5  Adduction- 4+/5 BL  Extension: 4+/5 BL     Shoulder ROM  Flexion: 160 BL   Abduction: 150 BL  ER- R: 45 L: 35 with pain   IR- R: 50 L: 50      Shoulder MMT  Flexion- R: 4+/5  L: 4/5   Abduction- R: 4+/5  L: 4/5 painful   ER- R: 5/5 L: 4+/5 painful   IR- 5/5 BL4     Outcome Measures: (updated 1/30/24)  Other Measures  Oswestry Disablity Index (CONNOR): 2/50   QuickDASH: 12/55     Treatments:   [id number] access code     THERE EX  UBE x 6 min alt each min  Pt education on outcome score   PROM of L shoulder   Supine banded ER (green) 2 x 10   Banded ER forward punch (green) 2 x 10   Supine banded flexion (red) 2 x 15   Standing banded row (black) 2 x 10     MANUAL    Theragun to RTC musculature  AP mobs with PROM     Assessment:  Pt tolerated session well today.  Patient was able  to show that he has made progress towards goals that were established and the plan of care via improved outcome scores, improve strength, and improved overall function.  Patient was educated on what exercises he should continue to perform for his HEP.  Patient has made excellent progress during his episode of care and will be discharged today.    Discharge Summary      Discharge Summary: PT    Discharge Information: Date of discharge 1/30/24, Date of last visit 1/30/24, Date of evaluation 12/4/23, Number of attended visits 7, Referred by Angela Pulido, and Referred for L shoulder pain     Therapy Summary: Pt accomplished all goals that were established in the POC     Discharge Status: Back to PLOF      Rehab Discharge Reason: Achieved all and/or the most significant goals(s)        Plan:     Planned Interventions include: therapeutic exercise, self-care home management, manual therapy, therapeutic activities, gait training, neuromuscular coordination, vasopneumatic, dry needling, aquatic therapy      Goals:  Active       PT Problem       PT Lumbar Goals       Start:  12/05/23       1. Pt will decrease CONNOR to 5% or less in order to demo a decrease in back pain   2. Pt petr increase all hip MMT to 4+/5 or greater in order to demo an increase in muscular strength   3. Pt will be able to perform repeated lumbar/thoracic flexion, extension, and side bending with no increase in discomfort in order to demo an increase in functional mobility   4. Pt will be able to stand for 1 hour with no increase in pain or discomfort in order to demo an increase in functional ability           PT Shoulder  Goals       Start:  12/05/23       1. Pt will decrease QuickDASH to 12/55 or better in order to demonstrate an increase in UE function   2. Pt will increase her L shoulder flexion, abduction, and ER by a minimum of 10 degrees in order to demonstrate an increase in UE ROM.   3. Pt will increase all UE MMT to 4+/5 or better in order  to demonstrate an increase in functional strength  4. Pt will be able to place items in high cabinet with no increase in pain or discomfort in order to demonstrate an increase in functional mobility.   5. Pt will be able to wash hair with L UE with no feeling of stiffness in order to demonstrate an increase in functional mobility.   6. Pt will complete HEP independently and compliantly with no increase in pain or discomfort                Selvin Rojas PT

## 2024-03-06 ENCOUNTER — APPOINTMENT (OUTPATIENT)
Dept: OPHTHALMOLOGY | Facility: CLINIC | Age: 39
End: 2024-03-06
Payer: MEDICAID

## 2024-05-23 ENCOUNTER — OFFICE VISIT (OUTPATIENT)
Dept: OPHTHALMOLOGY | Facility: CLINIC | Age: 39
End: 2024-05-23
Payer: MEDICAID

## 2024-05-23 DIAGNOSIS — H52.13 MYOPIA, BILATERAL: ICD-10-CM

## 2024-05-23 DIAGNOSIS — H53.8 BLURRED VISION: Primary | ICD-10-CM

## 2024-05-23 DIAGNOSIS — H52.223 REGULAR ASTIGMATISM OF BOTH EYES: ICD-10-CM

## 2024-05-23 PROCEDURE — 92004 COMPRE OPH EXAM NEW PT 1/>: CPT | Performed by: OPHTHALMOLOGY

## 2024-05-23 PROCEDURE — 92015 DETERMINE REFRACTIVE STATE: CPT | Performed by: OPHTHALMOLOGY

## 2024-05-23 ASSESSMENT — REFRACTION_MANIFEST
OD_SPHERE: -0.75
OD_AXIS: 095
OD_CYLINDER: -0.75
OS_SPHERE: -1.00
OD_AXIS: 095
OS_AXIS: 075
OS_SPHERE: -0.50
OS_CYLINDER: -0.50
OS_CYLINDER: -0.50
METHOD_AUTOREFRACTION: 1
OD_CYLINDER: -0.75
OD_SPHERE: -0.50
OS_AXIS: 075

## 2024-05-23 ASSESSMENT — EXTERNAL EXAM - RIGHT EYE: OD_EXAM: NORMAL

## 2024-05-23 ASSESSMENT — TONOMETRY
OS_IOP_MMHG: 22
OD_IOP_MMHG: 19
IOP_METHOD: GOLDMANN APPLANATION

## 2024-05-23 ASSESSMENT — CUP TO DISC RATIO
OS_RATIO: 0.2
OD_RATIO: 0.2

## 2024-05-23 ASSESSMENT — VISUAL ACUITY
OS_SC: 20/30
METHOD: SNELLEN - LINEAR
OS_PH_SC: 20/20
OD_SC: 20/30-1
OD_PH_SC: 20/20

## 2024-05-23 ASSESSMENT — SLIT LAMP EXAM - LIDS
COMMENTS: NORMAL
COMMENTS: NORMAL

## 2024-05-23 ASSESSMENT — EXTERNAL EXAM - LEFT EYE: OS_EXAM: NORMAL

## 2024-05-23 NOTE — PROGRESS NOTES
Assessment/Plan   Diagnoses and all orders for this visit:  Blurred vision  Improved with refraction    Myopia, bilateral  Regular astigmatism of both eyes  Refractive error  -give Rx for new glasses    Return for a dilated exam in   12  months or sooner if having any problems

## 2024-06-15 ASSESSMENT — ENCOUNTER SYMPTOMS
CHEST TIGHTNESS: 0
ANAL BLEEDING: 0
NERVOUS/ANXIOUS: 0
SORE THROAT: 0
TREMORS: 0
FREQUENCY: 0
ARTHRALGIAS: 0
SLEEP DISTURBANCE: 0
CONSTIPATION: 0
SHORTNESS OF BREATH: 0
HEMATURIA: 0
PALPITATIONS: 0
FATIGUE: 0
POLYPHAGIA: 0
FACIAL SWELLING: 0
DIARRHEA: 0
HEADACHES: 0
CHILLS: 0
ABDOMINAL PAIN: 0
EYE DISCHARGE: 0
EYE PAIN: 0
APPETITE CHANGE: 0
MYALGIAS: 0
DIZZINESS: 0
POLYDIPSIA: 0
NAUSEA: 0
WEAKNESS: 0
JOINT SWELLING: 0
FEVER: 0
VOMITING: 0
ABDOMINAL DISTENTION: 0
CONFUSION: 0
DIFFICULTY URINATING: 0
CHOKING: 0
NUMBNESS: 0
WHEEZING: 0
COLOR CHANGE: 0
COUGH: 0

## 2024-06-15 NOTE — PROGRESS NOTES
"Subjective   Patient ID: Pavel Ha is a 38 y.o. male with no significant medical history who presents for Facial Swelling (Right/Onset:   4-5 days/Tx:   Eye Drops/ water to rinse).    HPI the patient developed swelling and redness of his right upper eyelid 5 days ago which had improved 2 days ago.  There is no more redness or swelling.  He denies any lesions, itchiness, excessive tearing, or any discharge.  He denies fever or chills.  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    His left shoulder pain had improved with PT.  Still has some pain while laying on the same side.    Review of Systems   Constitutional:  Negative for appetite change, chills, fatigue and fever.   HENT:  Negative for congestion, ear pain, facial swelling, hearing loss, nosebleeds and sore throat.    Eyes:  Negative for pain, discharge and visual disturbance.   Respiratory:  Negative for cough, choking, chest tightness, shortness of breath and wheezing.    Cardiovascular:  Negative for chest pain, palpitations and leg swelling.   Gastrointestinal:  Negative for abdominal distention, abdominal pain, anal bleeding, constipation, diarrhea, nausea and vomiting.   Endocrine: Negative for cold intolerance, heat intolerance, polydipsia, polyphagia and polyuria.   Genitourinary:  Negative for difficulty urinating, frequency, hematuria and urgency.   Musculoskeletal:  Negative for arthralgias, gait problem, joint swelling and myalgias.        Left shoulder pain, lower back pain   Skin:  Negative for color change and rash.   Neurological:  Negative for dizziness, tremors, syncope, weakness, numbness and headaches.        Tingling and numbness of left hand   Psychiatric/Behavioral:  Negative for behavioral problems, confusion, sleep disturbance and suicidal ideas. The patient is not nervous/anxious.        Objective   /70   Temp 36.3 °C (97.3 °F) (Temporal)   Ht 1.702 m (5' 7\")   Wt 83.9 kg (185 lb)   BMI 28.98 kg/m²     Physical " Exam  Constitutional:       General: He is not in acute distress.     Appearance: Normal appearance.   HENT:      Head: Normocephalic and atraumatic.      Nose: Nose normal.   Eyes:      Extraocular Movements: Extraocular movements intact.      Conjunctiva/sclera: Conjunctivae normal.      Pupils: Pupils are equal, round, and reactive to light.   Cardiovascular:      Rate and Rhythm: Normal rate and regular rhythm.      Pulses: Normal pulses.      Heart sounds: Normal heart sounds.   Pulmonary:      Effort: Pulmonary effort is normal.      Breath sounds: Normal breath sounds.   Abdominal:      General: Bowel sounds are normal.      Palpations: Abdomen is soft.   Musculoskeletal:         General: Normal range of motion.      Cervical back: Normal range of motion and neck supple.   Neurological:      General: No focal deficit present.      Mental Status: He is alert and oriented to person, place, and time.   Psychiatric:         Mood and Affect: Mood normal.         Behavior: Behavior normal.         Thought Content: Thought content normal.         Judgment: Judgment normal.         Assessment/Plan   Irritation of right upper eyelid  Had resolved    Left earache  Most likely due to TMJ  Improved  The patient was advised to get a mouthguard  If no improvement, will consider ENT referral    Low back pain  Occasional  With radiation to the left lower leg  Continue physical therapy  Apply heating pads  Take Tylenol Extra Strength 2-3 times a day as needed for pain  Avoid NSAIDs  X-ray lumbar demonstrated spondylosis and facet disease    Left shoulder pain  Improved with physical therapy  Apply heating pads  Apply topical creams such as Voltaren or Biofreeze    Tingling and numbness of left hand  Most likely due to carpal tunnel syndrome  Wear a wrist brace at night  Avoid repetitive movements  If it gets worse, we will refer to hand specialist    Follow-up as needed

## 2024-06-17 ENCOUNTER — APPOINTMENT (OUTPATIENT)
Dept: PRIMARY CARE | Facility: CLINIC | Age: 39
End: 2024-06-17
Payer: MEDICAID

## 2024-06-17 VITALS
HEIGHT: 67 IN | WEIGHT: 185 LBS | SYSTOLIC BLOOD PRESSURE: 110 MMHG | BODY MASS INDEX: 29.03 KG/M2 | TEMPERATURE: 97.3 F | DIASTOLIC BLOOD PRESSURE: 70 MMHG

## 2024-06-17 DIAGNOSIS — H02.89 IRRITATION OF EYELID: Primary | ICD-10-CM

## 2024-06-17 PROCEDURE — 99212 OFFICE O/P EST SF 10 MIN: CPT | Performed by: PHYSICIAN ASSISTANT

## 2024-06-17 PROCEDURE — 1036F TOBACCO NON-USER: CPT | Performed by: PHYSICIAN ASSISTANT

## 2024-06-17 ASSESSMENT — PATIENT HEALTH QUESTIONNAIRE - PHQ9
1. LITTLE INTEREST OR PLEASURE IN DOING THINGS: NOT AT ALL
SUM OF ALL RESPONSES TO PHQ9 QUESTIONS 1 AND 2: 0
2. FEELING DOWN, DEPRESSED OR HOPELESS: NOT AT ALL

## 2024-06-17 ASSESSMENT — COLUMBIA-SUICIDE SEVERITY RATING SCALE - C-SSRS
2. HAVE YOU ACTUALLY HAD ANY THOUGHTS OF KILLING YOURSELF?: NO
6. HAVE YOU EVER DONE ANYTHING, STARTED TO DO ANYTHING, OR PREPARED TO DO ANYTHING TO END YOUR LIFE?: NO
1. IN THE PAST MONTH, HAVE YOU WISHED YOU WERE DEAD OR WISHED YOU COULD GO TO SLEEP AND NOT WAKE UP?: NO

## 2024-06-17 ASSESSMENT — ENCOUNTER SYMPTOMS
LOSS OF SENSATION IN FEET: 0
OCCASIONAL FEELINGS OF UNSTEADINESS: 0
DEPRESSION: 0

## 2024-06-17 ASSESSMENT — PAIN SCALES - GENERAL: PAINLEVEL: 2

## 2025-02-19 ENCOUNTER — TELEPHONE (OUTPATIENT)
Dept: PRIMARY CARE | Facility: CLINIC | Age: 40
End: 2025-02-19
Payer: MEDICAID

## 2025-02-19 DIAGNOSIS — K64.9 HEMORRHOIDS, UNSPECIFIED HEMORRHOID TYPE: Primary | ICD-10-CM

## 2025-02-19 NOTE — TELEPHONE ENCOUNTER
Lakhwinder, please, let him know, I placed the order for colorectal surgery, he can call [facility] [phone] to schedule

## 2025-02-21 ENCOUNTER — OFFICE VISIT (OUTPATIENT)
Dept: SURGERY | Facility: CLINIC | Age: 40
End: 2025-02-21
Payer: MEDICAID

## 2025-02-21 VITALS
DIASTOLIC BLOOD PRESSURE: 81 MMHG | BODY MASS INDEX: 28.04 KG/M2 | SYSTOLIC BLOOD PRESSURE: 124 MMHG | WEIGHT: 185 LBS | HEART RATE: 81 BPM | HEIGHT: 68 IN

## 2025-02-21 DIAGNOSIS — K64.9 HEMORRHOIDS, UNSPECIFIED HEMORRHOID TYPE: ICD-10-CM

## 2025-02-21 DIAGNOSIS — K64.5 THROMBOSED EXTERNAL HEMORRHOID: Primary | ICD-10-CM

## 2025-02-21 PROCEDURE — 99203 OFFICE O/P NEW LOW 30 MIN: CPT | Performed by: NURSE PRACTITIONER

## 2025-02-21 PROCEDURE — 99213 OFFICE O/P EST LOW 20 MIN: CPT | Performed by: NURSE PRACTITIONER

## 2025-02-21 PROCEDURE — 3008F BODY MASS INDEX DOCD: CPT | Performed by: NURSE PRACTITIONER

## 2025-02-21 RX ORDER — HYDROCORTISONE 25 MG/G
OINTMENT TOPICAL 2 TIMES DAILY
Qty: 28 G | Refills: 1 | Status: SHIPPED | OUTPATIENT
Start: 2025-02-21

## 2025-02-21 RX ORDER — LIDOCAINE 50 MG/G
OINTMENT TOPICAL AS NEEDED
Qty: 35 G | Refills: 2 | Status: SHIPPED | OUTPATIENT
Start: 2025-02-21 | End: 2025-06-21

## 2025-02-21 NOTE — PROGRESS NOTES
History Of Present Illness  Pavel Ha is a 39 y.o. male presenting with hemorrhoidal issues.    He has been having anal discomfort over the past week from a hemorrhoid that started to increase in size.  No c/o any bleeding just the pain.  He had some constipation and diarrhea over the past month.  He will have a soft Bm daily to every other day.  He does not take any fiber supplements or Colace.  No c/o any accidents or leakage of stool.      No colonoscopy or any perianal surgeries.      No family hx of colorectal cancer.       Past Medical History  He has no past medical history on file.    Surgical History  He has no past surgical history on file.     Social History  He reports that he quit smoking about 4 years ago. His smoking use included cigarettes. He started smoking about 27 years ago. He has a 5.8 pack-year smoking history. He has never used smokeless tobacco. He reports current alcohol use of about 6.0 standard drinks of alcohol per week. He reports that he does not use drugs.    Family History  Family History   Problem Relation Name Age of Onset    Migraines Mother          Allergies  Pollen extracts    Review of Systems   All other systems reviewed and are negative.       Physical Exam  Exam conducted with a chaperone present.   Constitutional:       Appearance: Normal appearance.   HENT:      Head: Normocephalic and atraumatic.   Pulmonary:      Effort: Pulmonary effort is normal.   Genitourinary:     Comments: He has a small thrombosed external hemorrhoid in the left lateral position.  The hemorrhoid is softening with minimal discomfort.  No bleeding.  Musculoskeletal:         General: Normal range of motion.   Skin:     General: Skin is warm and dry.   Neurological:      General: No focal deficit present.      Mental Status: He is alert and oriented to person, place, and time.   Psychiatric:         Mood and Affect: Mood normal.         Behavior: Behavior normal.          Last Recorded  Vitals  Wt 83.9 kg (185 lb)        Assessment/Plan   Pavel has a small thrombosed external hemorrhoid in the left lateral position that is softening.  He will start using hydrocortisone and Lidocaine ointment 2-3x/day to help with the inflammation and discomfort.  He will start taking Benefiber daily to keep his stools more soft.  He will continue to increase his fiber and water intake.  He will call with any issues and will follow up in 2-3 weeks if not better.       Molly Neville, APRN-CNP

## 2025-03-07 ENCOUNTER — OFFICE VISIT (OUTPATIENT)
Dept: PRIMARY CARE | Facility: CLINIC | Age: 40
End: 2025-03-07
Payer: MEDICAID

## 2025-03-07 VITALS
SYSTOLIC BLOOD PRESSURE: 110 MMHG | WEIGHT: 192 LBS | TEMPERATURE: 97.2 F | HEIGHT: 68 IN | BODY MASS INDEX: 29.1 KG/M2 | DIASTOLIC BLOOD PRESSURE: 70 MMHG

## 2025-03-07 DIAGNOSIS — E53.8 VITAMIN B12 DEFICIENCY: ICD-10-CM

## 2025-03-07 DIAGNOSIS — E55.9 MILD VITAMIN D DEFICIENCY: ICD-10-CM

## 2025-03-07 DIAGNOSIS — R53.83 OTHER FATIGUE: ICD-10-CM

## 2025-03-07 DIAGNOSIS — R20.0 NUMBNESS OF LIP: Primary | ICD-10-CM

## 2025-03-07 PROBLEM — H02.89 IRRITATION OF EYELID: Status: RESOLVED | Noted: 2024-06-17 | Resolved: 2025-03-07

## 2025-03-07 LAB
25(OH)D3 SERPL-MCNC: 19 NG/ML (ref 30–100)
ANION GAP SERPL CALC-SCNC: 15 MMOL/L (ref 10–20)
BASOPHILS # BLD AUTO: 0.03 X10*3/UL (ref 0.1–1.6)
BASOPHILS NFR BLD AUTO: 0.37 % (ref 0–0.3)
BUN SERPL-MCNC: 12 MG/DL (ref 7–18)
CALCIUM SERPL-MCNC: 9.4 MG/DL (ref 8.5–10.1)
CHLORIDE SERPL-SCNC: 102 MMOL/L (ref 98–107)
CO2 SERPL-SCNC: 29 MMOL/L (ref 21–32)
CREAT SERPL-MCNC: 0.98 MG/DL (ref 0.6–1.1)
EGFRCR SERPLBLD CKD-EPI 2021: >90 ML/MIN/1.73M*2
EOSINOPHIL # BLD AUTO: 0.04 X10*3/UL (ref 0.04–0.5)
EOSINOPHIL NFR BLD AUTO: 0.51 % (ref 0.7–7)
ERYTHROCYTE [DISTWIDTH] IN BLOOD BY AUTOMATED COUNT: 14.5 % (ref 11.5–14.5)
FOLATE SERPL-MCNC: 12.8 NG/ML (ref 8.6–58.9)
GLUCOSE SERPL-MCNC: 93 MG/DL (ref 74–100)
HCT VFR BLD AUTO: 44 % (ref 38.4–51.3)
HGB BLD-MCNC: 15.61 G/DL (ref 12.7–17)
LYMPHOCYTES # BLD AUTO: 2.02 X10*3/UL (ref 0–6)
LYMPHOCYTES NFR BLD AUTO: 29.29 % (ref 20.5–51.1)
MAGNESIUM SERPL-MCNC: 2.31 MG/DL (ref 1.6–2.4)
MCH RBC QN AUTO: 32.7 PG (ref 26–32)
MCHC RBC AUTO-ENTMCNC: 35.5 G/DL (ref 31–38)
MCV RBC AUTO: 92 FL (ref 80–96)
MONOCYTES # BLD AUTO: 0.52 X10*3/UL (ref 1.6–24.9)
MONOCYTES NFR BLD AUTO: 7.47 % (ref 1.7–9.3)
NEUTROPHILS # BLD AUTO: 4.3 X10*3/UL (ref 1.4–6.5)
NEUTROPHILS NFR BLD AUTO: 62.36 % (ref 42.2–75.2)
PLATELET # BLD AUTO: 254.1 X10*3/UL (ref 150–450)
PMV BLD AUTO: 8.26 FL (ref 7.8–11)
POTASSIUM SERPL-SCNC: 4 MMOL/L (ref 3.5–5.1)
RBC # BLD AUTO: 4.78 X10*6/UL (ref 4.1–5.6)
SODIUM SERPL-SCNC: 142 MMOL/L (ref 136–145)
TSH SERPL-ACNC: 1.73 MIU/L (ref 0.44–3.98)
VIT B12 SERPL-MCNC: 415 PG/ML (ref 193–986)
WBC # BLD AUTO: 6.9 X10*3/UL (ref 4.5–10.5)

## 2025-03-07 PROCEDURE — 82306 VITAMIN D 25 HYDROXY: CPT | Performed by: PHYSICIAN ASSISTANT

## 2025-03-07 PROCEDURE — 80048 BASIC METABOLIC PNL TOTAL CA: CPT | Performed by: PHYSICIAN ASSISTANT

## 2025-03-07 PROCEDURE — 3008F BODY MASS INDEX DOCD: CPT | Performed by: PHYSICIAN ASSISTANT

## 2025-03-07 PROCEDURE — 85025 COMPLETE CBC W/AUTO DIFF WBC: CPT | Performed by: PHYSICIAN ASSISTANT

## 2025-03-07 PROCEDURE — 1036F TOBACCO NON-USER: CPT | Performed by: PHYSICIAN ASSISTANT

## 2025-03-07 PROCEDURE — 82746 ASSAY OF FOLIC ACID SERUM: CPT | Performed by: PHYSICIAN ASSISTANT

## 2025-03-07 PROCEDURE — 99214 OFFICE O/P EST MOD 30 MIN: CPT | Performed by: PHYSICIAN ASSISTANT

## 2025-03-07 PROCEDURE — 83735 ASSAY OF MAGNESIUM: CPT

## 2025-03-07 PROCEDURE — 84443 ASSAY THYROID STIM HORMONE: CPT | Performed by: PHYSICIAN ASSISTANT

## 2025-03-07 PROCEDURE — 82607 VITAMIN B-12: CPT | Performed by: PHYSICIAN ASSISTANT

## 2025-03-07 ASSESSMENT — ENCOUNTER SYMPTOMS
SLEEP DISTURBANCE: 0
DIZZINESS: 0
CHEST TIGHTNESS: 0
JOINT SWELLING: 0
COUGH: 0
APPETITE CHANGE: 0
POLYDIPSIA: 0
FATIGUE: 0
DIARRHEA: 0
MYALGIAS: 0
FREQUENCY: 0
COLOR CHANGE: 0
LOSS OF SENSATION IN FEET: 0
FEVER: 0
HEADACHES: 0
ABDOMINAL PAIN: 0
DIFFICULTY URINATING: 0
CHOKING: 0
WHEEZING: 0
CONSTIPATION: 0
HEMATURIA: 0
CHILLS: 0
PALPITATIONS: 0
POLYPHAGIA: 0
VOMITING: 0
SORE THROAT: 0
CONFUSION: 0
DEPRESSION: 0
WEAKNESS: 0
ANAL BLEEDING: 0
FACIAL SWELLING: 0
ABDOMINAL DISTENTION: 0
EYE PAIN: 0
SHORTNESS OF BREATH: 0
TREMORS: 0
EYE DISCHARGE: 0
NUMBNESS: 0
OCCASIONAL FEELINGS OF UNSTEADINESS: 0
NERVOUS/ANXIOUS: 0
ARTHRALGIAS: 0
NAUSEA: 0

## 2025-03-07 ASSESSMENT — COLUMBIA-SUICIDE SEVERITY RATING SCALE - C-SSRS
6. HAVE YOU EVER DONE ANYTHING, STARTED TO DO ANYTHING, OR PREPARED TO DO ANYTHING TO END YOUR LIFE?: NO
2. HAVE YOU ACTUALLY HAD ANY THOUGHTS OF KILLING YOURSELF?: NO
1. IN THE PAST MONTH, HAVE YOU WISHED YOU WERE DEAD OR WISHED YOU COULD GO TO SLEEP AND NOT WAKE UP?: NO

## 2025-03-07 ASSESSMENT — PAIN SCALES - GENERAL: PAINLEVEL_OUTOF10: 0-NO PAIN

## 2025-03-07 NOTE — PROGRESS NOTES
Subjective   Patient ID: Pavel Ha is a 39 y.o. male with  with a medical history of low back pain, TMJ arthritis, left shoulder pain who presents for Tingling of the lips.    HPI the patient developed tingling of his left sided upper and lower lips and a tip of the tongue 3 weeks ago.  It comes and goes.  He has mild stress but denies anxiety or panic attacks.  Denies facial pain. Denies any trauma or injury to his face or neck.  Stated has been having some neck discomfort when he turns it to the right side.  He denies taking any new medications or supplements.  Denies recent dental work.  Denies any recent viral or bacterial illness.    Review of Systems   Constitutional:  Negative for appetite change, chills, fatigue and fever.   HENT:  Negative for congestion, ear pain, facial swelling, hearing loss, nosebleeds and sore throat.    Eyes:  Negative for pain, discharge and visual disturbance.   Respiratory:  Negative for cough, choking, chest tightness, shortness of breath and wheezing.    Cardiovascular:  Negative for chest pain, palpitations and leg swelling.   Gastrointestinal:  Negative for abdominal distention, abdominal pain, anal bleeding, constipation, diarrhea, nausea and vomiting.   Endocrine: Negative for cold intolerance, heat intolerance, polydipsia, polyphagia and polyuria.   Genitourinary:  Negative for difficulty urinating, frequency, hematuria and urgency.   Musculoskeletal:  Negative for arthralgias, gait problem, joint swelling and myalgias.   Skin:  Negative for color change and rash.   Neurological:  Negative for dizziness, tremors, syncope, weakness, numbness and headaches.        Tingling and numbness of left upper and lower lip and tongue   Psychiatric/Behavioral:  Negative for behavioral problems, confusion, sleep disturbance and suicidal ideas. The patient is not nervous/anxious.        Objective   /70 (Patient Position: Sitting)   Temp 36.2 °C (97.2 °F) (Temporal)   Ht  "1.727 m (5' 8\")   Wt 87.1 kg (192 lb)   BMI 29.19 kg/m²     Physical Exam  Constitutional:       General: He is not in acute distress.     Appearance: Normal appearance.   HENT:      Head: Normocephalic and atraumatic.      Nose: Nose normal.   Eyes:      Extraocular Movements: Extraocular movements intact.      Conjunctiva/sclera: Conjunctivae normal.      Pupils: Pupils are equal, round, and reactive to light.   Cardiovascular:      Rate and Rhythm: Normal rate and regular rhythm.      Pulses: Normal pulses.      Heart sounds: Normal heart sounds.   Pulmonary:      Effort: Pulmonary effort is normal.      Breath sounds: Normal breath sounds.   Abdominal:      General: Bowel sounds are normal.      Palpations: Abdomen is soft.   Musculoskeletal:         General: Normal range of motion.      Cervical back: Normal range of motion and neck supple.   Neurological:      General: No focal deficit present.      Mental Status: He is alert and oriented to person, place, and time.   Psychiatric:         Mood and Affect: Mood normal.         Behavior: Behavior normal.         Thought Content: Thought content normal.         Judgment: Judgment normal.         Assessment/Plan   Tingling and numbness of lips and tongue  We obtained blood work including calcium, magnesium and vitamins  Will consider x-ray cervical to rule out any nerve impingement  Will consider ENT referral if no improvement    Left earache  Most likely due to TMJ  Improved  The patient was advised to get a mouthguard  If no improvement, will consider ENT referral    Low back pain  Occasional  With radiation to the left lower leg  Continue physical therapy  Apply heating pads  Take Tylenol Extra Strength 2-3 times a day as needed for pain  Avoid NSAIDs  X-ray lumbar demonstrated spondylosis and facet disease    Left shoulder pain  Improved with physical therapy  Apply heating pads  Apply topical creams such as Voltaren or Biofreeze    Tingling and numbness of " left hand  Most likely due to carpal tunnel syndrome  Wear a wrist brace at night  Avoid repetitive movements  If it gets worse, we will refer to hand specialist    I will call with blood test results    Follow-up as needed

## 2025-03-08 LAB
FERRITIN SERPL-MCNC: 75 NG/ML (ref 38–380)
IRON SATN MFR SERPL: 26 % (CALC) (ref 20–48)
IRON SERPL-MCNC: 88 MCG/DL (ref 50–180)
TIBC SERPL-MCNC: 334 MCG/DL (CALC) (ref 250–425)

## 2025-03-10 ENCOUNTER — TELEPHONE (OUTPATIENT)
Dept: PRIMARY CARE | Facility: CLINIC | Age: 40
End: 2025-03-10
Payer: MEDICAID

## 2025-03-10 DIAGNOSIS — E55.9 MILD VITAMIN D DEFICIENCY: Primary | ICD-10-CM

## 2025-03-10 RX ORDER — ERGOCALCIFEROL 1.25 MG/1
50000 CAPSULE ORAL WEEKLY
Qty: 12 CAPSULE | Refills: 0 | Status: SHIPPED | OUTPATIENT
Start: 2025-03-10 | End: 2025-06-02

## 2025-03-10 NOTE — TELEPHONE ENCOUNTER
----- Message from Angela Pulido sent at 3/10/2025  3:20 PM EDT -----  Please call him with blood test results.  His vitamin D is low I will prescribe him vitamin D 50,000 units once a week he needs to take for 3 months.  The rest of the blood work is pretty good.  Ask him if he would like to get an x-ray of his neck to see if there is any pinched nerve and if he wants to be seen by ENT.

## 2025-03-11 ENCOUNTER — HOSPITAL ENCOUNTER (OUTPATIENT)
Dept: RADIOLOGY | Facility: CLINIC | Age: 40
Discharge: HOME | End: 2025-03-11
Payer: MEDICAID

## 2025-03-11 ENCOUNTER — APPOINTMENT (OUTPATIENT)
Dept: PRIMARY CARE | Facility: CLINIC | Age: 40
End: 2025-03-11
Payer: MEDICAID

## 2025-03-11 DIAGNOSIS — M54.2 CERVICALGIA: Primary | ICD-10-CM

## 2025-03-11 DIAGNOSIS — M54.2 CERVICALGIA: ICD-10-CM

## 2025-03-11 PROCEDURE — 72050 X-RAY EXAM NECK SPINE 4/5VWS: CPT

## 2025-03-11 PROCEDURE — 72050 X-RAY EXAM NECK SPINE 4/5VWS: CPT | Performed by: RADIOLOGY

## 2025-03-14 DIAGNOSIS — R20.0 NUMBNESS OF LIP: Primary | ICD-10-CM

## 2025-03-21 DIAGNOSIS — L98.9 SKIN LESION: Primary | ICD-10-CM

## 2025-04-07 ENCOUNTER — APPOINTMENT (OUTPATIENT)
Dept: OTOLARYNGOLOGY | Facility: CLINIC | Age: 40
End: 2025-04-07
Payer: MEDICAID

## 2025-04-07 VITALS — TEMPERATURE: 97.3 F | HEIGHT: 68 IN | WEIGHT: 190 LBS | BODY MASS INDEX: 28.79 KG/M2

## 2025-04-07 DIAGNOSIS — M54.2 NECK PAIN: ICD-10-CM

## 2025-04-07 DIAGNOSIS — R20.0 NUMBNESS OF LIP: Primary | ICD-10-CM

## 2025-04-07 PROCEDURE — 99204 OFFICE O/P NEW MOD 45 MIN: CPT | Performed by: STUDENT IN AN ORGANIZED HEALTH CARE EDUCATION/TRAINING PROGRAM

## 2025-04-07 PROCEDURE — 3008F BODY MASS INDEX DOCD: CPT | Performed by: STUDENT IN AN ORGANIZED HEALTH CARE EDUCATION/TRAINING PROGRAM

## 2025-04-07 ASSESSMENT — PATIENT HEALTH QUESTIONNAIRE - PHQ9
2. FEELING DOWN, DEPRESSED OR HOPELESS: NOT AT ALL
1. LITTLE INTEREST OR PLEASURE IN DOING THINGS: NOT AT ALL
SUM OF ALL RESPONSES TO PHQ9 QUESTIONS 1 & 2: 0

## 2025-04-07 NOTE — PROGRESS NOTES
HEAD AND NECK SURGERY CONSULT  [facility]    Referring Provider: Tess VELÁZQUEZ  I had the pleasure of seeing Pavel Ha as a consultation today for evaluation of lip numbness and neck discomfort.     He reports left lip and tongue numbness and tingling that has been present for 3-4 weeks. He is not sure what makes it better or worse. No trauma, illness, travel, new exposures or other inciting event he can identify. He thinks it is present all the time. Also endorses posterior neck pain that runs along his left shoulder. This has been present for a while, he has been more cognizant of posture and doing neck stretches, recently changed jobs and thinks it is getting better. Denies other symptoms.    The patient denies having prior trauma or surgery to their head and neck. There is not a personal or family history of blood clots, easy bleeding or bruising, or anesthesia concerns. Not currently smoking.    Tobacco use: The patient  reports that he quit smoking about 4 years ago. His smoking use included cigarettes. He started smoking about 27 years ago. He has a 5.8 pack-year smoking history. He has never used smokeless tobacco.   Alcohol Use: The patient  reports current alcohol use of about 6.0 standard drinks of alcohol per week.     Physical Examination  Vitals:  There were no vitals taken for this visit.  General: Examination reveals a well-developed, well-nourished patient in no apparent distress.  The patient has no audible dysphonia, stridor or airway distress.  The patient is oriented, alert and responsive.    Ears: Bilateral EAC patent, TM visualized and intact, no middle ear effusion   Face: no lesions of the face, symmetric movement  Oral Cavity:  The patient is able to open the mouth widely without trismus.  The floor of mouth and oral tongue are soft, and no mucosal abnormalities are noted on the lips or within the oral cavity.  The oral tongue is fully mobile and midline on  protrusion.  The patient's teeth are intact  Oropharynx: There are no mucosal abnormalities noted within the oropharynx.  The soft palate elevates symmetrically, the tonsils and base of tongue are normal to inspection and palpation.       Salivary glands: There are no palpable masses of the parotid or submandibular glands.   Neuro: Cranial nerves 2-12 are without obvious abnormality.  Neck: The neck is soft and symmetric.  There is no palpable adenopathy. Tenderness to palpation along left trapezius     DATA REVIEWED:  Labs:  Lab Results   Component Value Date    WBC 6.90 03/07/2025    HGB 15.61 03/07/2025    HCT 44.0 03/07/2025    .1 03/07/2025    NEUTROABS 4.30 03/07/2025     Lab Results   Component Value Date    TSH 1.73 03/07/2025    [id number] 415 03/07/2025    FOLATE 12.8 03/07/2025        Radiology: I personally reviewed the xray from 3/11/25 which was normal    Review of prior medical records: I reviewed the patient's medical records which included a clinic note from CHRISTEN Ivey (PCP) detailing work up of lip/tongue numbness including blood work and xray, which were normal.       ASSESSMENT and PLAN:    Lip/tongue numbness  - Reviewed exam with patient at length, I do not see an obvious lesion, mass or other abnormality on head and neck exam that would cause his symptoms. Work up including labs so far have been unremarkable. Will obtain an MRI and refer to neurology to have them weigh in on further work up pending results    Neck pain  - Discussed his posterior neck pain is likely musculoskeletal in nature given the distribution along the trapezius muscle. Encouraged continued posture changes and stretching. Can do ibuprofen/tylenol and warm compresses. If worsens can consider physical therapy     Maribel Montemayor MD

## 2025-04-21 ENCOUNTER — APPOINTMENT (OUTPATIENT)
Dept: RADIOLOGY | Facility: CLINIC | Age: 40
End: 2025-04-21
Payer: MEDICAID

## 2025-05-29 ENCOUNTER — APPOINTMENT (OUTPATIENT)
Dept: OPHTHALMOLOGY | Facility: CLINIC | Age: 40
End: 2025-05-29
Payer: MEDICAID

## 2025-07-01 ENCOUNTER — APPOINTMENT (OUTPATIENT)
Dept: DERMATOLOGY | Facility: CLINIC | Age: 40
End: 2025-07-01
Payer: MEDICAID

## 2025-08-01 ENCOUNTER — APPOINTMENT (OUTPATIENT)
Dept: NEUROLOGY | Facility: CLINIC | Age: 40
End: 2025-08-01
Payer: MEDICAID

## 2025-08-12 ENCOUNTER — APPOINTMENT (OUTPATIENT)
Dept: NEUROLOGY | Facility: CLINIC | Age: 40
End: 2025-08-12
Payer: MEDICAID

## 2025-08-12 VITALS
HEIGHT: 68 IN | BODY MASS INDEX: 27.58 KG/M2 | DIASTOLIC BLOOD PRESSURE: 86 MMHG | SYSTOLIC BLOOD PRESSURE: 133 MMHG | HEART RATE: 76 BPM | WEIGHT: 182 LBS | TEMPERATURE: 96.9 F

## 2025-08-12 DIAGNOSIS — G50.9 TRIGEMINAL NEUROPATHY: Primary | ICD-10-CM

## 2025-08-12 PROCEDURE — 3008F BODY MASS INDEX DOCD: CPT | Performed by: PSYCHIATRY & NEUROLOGY

## 2025-08-12 PROCEDURE — 99204 OFFICE O/P NEW MOD 45 MIN: CPT | Performed by: PSYCHIATRY & NEUROLOGY

## 2025-08-19 ENCOUNTER — APPOINTMENT (OUTPATIENT)
Dept: DERMATOLOGY | Facility: CLINIC | Age: 40
End: 2025-08-19
Payer: COMMERCIAL

## 2025-08-19 DIAGNOSIS — B35.3 TINEA PEDIS OF RIGHT FOOT: Primary | ICD-10-CM

## 2025-08-19 DIAGNOSIS — D22.9 MULTIPLE BENIGN NEVI: ICD-10-CM

## 2025-08-19 DIAGNOSIS — D48.5 NEOPLASM OF UNCERTAIN BEHAVIOR OF SKIN: ICD-10-CM

## 2025-08-19 DIAGNOSIS — L57.8 DIFFUSE PHOTODAMAGE OF SKIN: ICD-10-CM

## 2025-08-19 PROCEDURE — 99203 OFFICE O/P NEW LOW 30 MIN: CPT | Performed by: STUDENT IN AN ORGANIZED HEALTH CARE EDUCATION/TRAINING PROGRAM

## 2025-08-19 RX ORDER — KETOCONAZOLE 20 MG/G
CREAM TOPICAL 2 TIMES DAILY
Qty: 60 G | Refills: 11 | Status: SHIPPED | OUTPATIENT
Start: 2025-08-19

## 2025-08-19 RX ORDER — CICLOPIROX 80 MG/ML
SOLUTION TOPICAL DAILY
Qty: 6.6 ML | Refills: 11 | Status: CANCELLED | OUTPATIENT
Start: 2025-08-19

## 2025-08-19 ASSESSMENT — ITCH NUMERIC RATING SCALE: HOW SEVERE IS YOUR ITCHING?: 0

## 2025-08-19 ASSESSMENT — DERMATOLOGY QUALITY OF LIFE (QOL) ASSESSMENT
RATE HOW EMOTIONALLY BOTHERED YOU ARE BY YOUR SKIN PROBLEM (FOR EXAMPLE, WORRY, EMBARRASSMENT, FRUSTRATION): 0 - NEVER BOTHERED
RATE HOW BOTHERED YOU ARE BY EFFECTS OF YOUR SKIN PROBLEMS ON YOUR ACTIVITIES (EG, GOING OUT, ACCOMPLISHING WHAT YOU WANT, WORK ACTIVITIES OR YOUR RELATIONSHIPS WITH OTHERS): 0 - NEVER BOTHERED
WHAT SINGLE SKIN CONDITION LISTED BELOW IS THE PATIENT ANSWERING THE QUALITY-OF-LIFE ASSESSMENT QUESTIONS ABOUT: NONE OF THE ABOVE
DATE THE QUALITY-OF-LIFE ASSESSMENT WAS COMPLETED: 67436
RATE HOW BOTHERED YOU ARE BY SYMPTOMS OF YOUR SKIN PROBLEM (EG, ITCHING, STINGING BURNING, HURTING OR SKIN IRRITATION): 0 - NEVER BOTHERED
ARE THERE EXCLUSIONS OR EXCEPTIONS FOR THE QUALITY OF LIFE ASSESSMENT: NO

## 2025-08-19 ASSESSMENT — DERMATOLOGY PATIENT ASSESSMENT
HAVE YOU HAD OR DO YOU HAVE A STAPH INFECTION: NO
HAVE YOU HAD OR DO YOU HAVE VASCULAR DISEASE: NO
ARE YOU AN ORGAN TRANSPLANT RECIPIENT: NO
DO YOU HAVE ANY NEW OR CHANGING LESIONS: NO
DO YOU USE A TANNING BED: YES, PREVIOUSLY

## 2025-08-19 ASSESSMENT — PATIENT GLOBAL ASSESSMENT (PGA): PATIENT GLOBAL ASSESSMENT: PATIENT GLOBAL ASSESSMENT:  1 - CLEAR

## 2025-08-25 DIAGNOSIS — B35.1 ONYCHOMYCOSIS: Primary | ICD-10-CM

## 2025-08-25 RX ORDER — ECONAZOLE NITRATE 10 MG/G
CREAM TOPICAL
Qty: 85 G | Refills: 1 | Status: SHIPPED | OUTPATIENT
Start: 2025-08-25

## 2025-08-26 ENCOUNTER — APPOINTMENT (OUTPATIENT)
Dept: RADIOLOGY | Facility: HOSPITAL | Age: 40
End: 2025-08-26
Payer: COMMERCIAL

## 2025-08-26 DIAGNOSIS — G50.9 TRIGEMINAL NEUROPATHY: ICD-10-CM

## 2025-08-26 PROCEDURE — 70553 MRI BRAIN STEM W/O & W/DYE: CPT

## 2025-08-26 PROCEDURE — 2550000001 HC RX 255 CONTRASTS: Performed by: PSYCHIATRY & NEUROLOGY

## 2025-08-26 PROCEDURE — 70553 MRI BRAIN STEM W/O & W/DYE: CPT | Performed by: RADIOLOGY

## 2025-08-26 PROCEDURE — A9575 INJ GADOTERATE MEGLUMI 0.1ML: HCPCS | Performed by: PSYCHIATRY & NEUROLOGY

## 2025-08-26 RX ORDER — GADOTERATE MEGLUMINE 376.9 MG/ML
16 INJECTION INTRAVENOUS
Status: COMPLETED | OUTPATIENT
Start: 2025-08-26 | End: 2025-08-26

## 2025-08-26 RX ADMIN — GADOTERATE MEGLUMINE 16 ML: 376.9 INJECTION INTRAVENOUS at 16:20

## 2025-09-23 ENCOUNTER — APPOINTMENT (OUTPATIENT)
Dept: DERMATOLOGY | Facility: CLINIC | Age: 40
End: 2025-09-23
Payer: MEDICAID

## 2026-01-15 ENCOUNTER — APPOINTMENT (OUTPATIENT)
Dept: NEUROLOGY | Facility: CLINIC | Age: 41
End: 2026-01-15
Payer: COMMERCIAL

## 2026-03-06 ENCOUNTER — APPOINTMENT (OUTPATIENT)
Dept: DERMATOLOGY | Facility: CLINIC | Age: 41
End: 2026-03-06
Payer: COMMERCIAL